# Patient Record
Sex: FEMALE | Race: WHITE | NOT HISPANIC OR LATINO
[De-identification: names, ages, dates, MRNs, and addresses within clinical notes are randomized per-mention and may not be internally consistent; named-entity substitution may affect disease eponyms.]

---

## 2019-11-21 PROBLEM — Z00.00 ENCOUNTER FOR PREVENTIVE HEALTH EXAMINATION: Status: ACTIVE | Noted: 2019-11-21

## 2019-12-02 ENCOUNTER — APPOINTMENT (OUTPATIENT)
Dept: COLORECTAL SURGERY | Facility: CLINIC | Age: 34
End: 2019-12-02
Payer: COMMERCIAL

## 2019-12-02 ENCOUNTER — APPOINTMENT (OUTPATIENT)
Dept: GASTROENTEROLOGY | Facility: CLINIC | Age: 34
End: 2019-12-02
Payer: COMMERCIAL

## 2019-12-02 VITALS
HEART RATE: 75 BPM | BODY MASS INDEX: 30.46 KG/M2 | WEIGHT: 201 LBS | SYSTOLIC BLOOD PRESSURE: 106 MMHG | HEIGHT: 68 IN | TEMPERATURE: 97.6 F | DIASTOLIC BLOOD PRESSURE: 71 MMHG

## 2019-12-02 VITALS
SYSTOLIC BLOOD PRESSURE: 123 MMHG | TEMPERATURE: 97.9 F | WEIGHT: 200 LBS | DIASTOLIC BLOOD PRESSURE: 78 MMHG | OXYGEN SATURATION: 100 % | HEART RATE: 76 BPM | BODY MASS INDEX: 30.31 KG/M2 | HEIGHT: 68 IN | RESPIRATION RATE: 14 BRPM

## 2019-12-02 DIAGNOSIS — R53.83 OTHER FATIGUE: ICD-10-CM

## 2019-12-02 DIAGNOSIS — Z87.891 PERSONAL HISTORY OF NICOTINE DEPENDENCE: ICD-10-CM

## 2019-12-02 DIAGNOSIS — K30 FUNCTIONAL DYSPEPSIA: ICD-10-CM

## 2019-12-02 DIAGNOSIS — R58 HEMORRHAGE, NOT ELSEWHERE CLASSIFIED: ICD-10-CM

## 2019-12-02 DIAGNOSIS — Z72.89 OTHER PROBLEMS RELATED TO LIFESTYLE: ICD-10-CM

## 2019-12-02 DIAGNOSIS — K59.00 CONSTIPATION, UNSPECIFIED: ICD-10-CM

## 2019-12-02 DIAGNOSIS — K90.49 MALABSORPTION DUE TO INTOLERANCE, NOT ELSEWHERE CLASSIFIED: ICD-10-CM

## 2019-12-02 DIAGNOSIS — Z86.19 PERSONAL HISTORY OF OTHER INFECTIOUS AND PARASITIC DISEASES: ICD-10-CM

## 2019-12-02 PROCEDURE — 99205 OFFICE O/P NEW HI 60 MIN: CPT | Mod: 25

## 2019-12-02 PROCEDURE — 99204 OFFICE O/P NEW MOD 45 MIN: CPT

## 2019-12-02 PROCEDURE — 36415 COLL VENOUS BLD VENIPUNCTURE: CPT

## 2019-12-02 RX ORDER — CHROMIUM 200 MCG
TABLET ORAL
Refills: 0 | Status: ACTIVE | COMMUNITY

## 2019-12-03 LAB
ALBUMIN SERPL ELPH-MCNC: 4.6 G/DL
ALP BLD-CCNC: 96 U/L
ALT SERPL-CCNC: 24 U/L
ANION GAP SERPL CALC-SCNC: 12 MMOL/L
AST SERPL-CCNC: 14 U/L
BASOPHILS # BLD AUTO: 0.08 K/UL
BASOPHILS NFR BLD AUTO: 0.9 %
BILIRUB SERPL-MCNC: <0.2 MG/DL
BUN SERPL-MCNC: 19 MG/DL
CALCIUM SERPL-MCNC: 9.6 MG/DL
CHLORIDE SERPL-SCNC: 106 MMOL/L
CO2 SERPL-SCNC: 23 MMOL/L
CREAT SERPL-MCNC: 0.9 MG/DL
CRP SERPL-MCNC: 1.37 MG/DL
EOSINOPHIL # BLD AUTO: 0.84 K/UL
EOSINOPHIL NFR BLD AUTO: 9.1 %
GLUCOSE SERPL-MCNC: 96 MG/DL
HBV CORE IGG+IGM SER QL: NONREACTIVE
HBV SURFACE AB SER QL: REACTIVE
HBV SURFACE AG SER QL: NONREACTIVE
HCT VFR BLD CALC: 44.9 %
HGB BLD-MCNC: 13.7 G/DL
IMM GRANULOCYTES NFR BLD AUTO: 0.2 %
INR PPP: 0.97 RATIO
LYMPHOCYTES # BLD AUTO: 2.1 K/UL
LYMPHOCYTES NFR BLD AUTO: 22.6 %
MAN DIFF?: NORMAL
MCHC RBC-ENTMCNC: 27.1 PG
MCHC RBC-ENTMCNC: 30.5 GM/DL
MCV RBC AUTO: 88.7 FL
MONOCYTES # BLD AUTO: 0.67 K/UL
MONOCYTES NFR BLD AUTO: 7.2 %
NEUTROPHILS # BLD AUTO: 5.57 K/UL
NEUTROPHILS NFR BLD AUTO: 60 %
PLATELET # BLD AUTO: 429 K/UL
POTASSIUM SERPL-SCNC: 4.9 MMOL/L
PROT SERPL-MCNC: 7.1 G/DL
PT BLD: 10.9 SEC
RBC # BLD: 5.06 M/UL
RBC # FLD: 15.4 %
SODIUM SERPL-SCNC: 141 MMOL/L
WBC # FLD AUTO: 9.28 K/UL

## 2019-12-04 NOTE — PHYSICAL EXAM
[Abdomen Tenderness] : ~T ~M Abdominal tenderness [No HSM] : no hepatosplenomegaly [Normal Breath Sounds] : Normal breath sounds [No Rash or Lesion] : No rash or lesion [Alert] : alert [Abdomen Masses] : No abdominal masses [JVD] : no jugular venous distention

## 2019-12-04 NOTE — HISTORY OF PRESENT ILLNESS
[FreeTextEntry1] : 32 yo F w/ recent diagnosis of Crohn's disease presents for evaluation, referred by ANA Torrez whom patient saw this morning\par hx of Abd pain since April 2019, initially seen by CRS Dr. Zamarripa, colonoscopy completed June 2019 and suggested Crohn's Disease.\par 6/25/19: Focal mild active ileitis, no epithelial dysplasis or adenomatous hyperplasia identified\par Focal neutrophilia w/n surface epithelium and a slightly increased number of chronic inflammatory cells and occasional neutrophils.\par Findings w/ focal mild active ileitis and are suggestive, but not diagnostic of Crohn's ileitis.\par Started her on Budesonide 3 pills daily which improves symptoms.\par \par Seen by PCP Dr. Sifuentes who started on antibiotics for possible UTI and referred to Dr. Miranda\par Referred by ANA Miranda to ANA Torrez and this office for additional evaluation\par Started on Cipro/Flagyl x 4 weeks for treatment of bladder abscess\par \par May 2019 CT a/p identified bladder abscess, pt reports recent CT scans indicate abscess worsening\par Reports bladder symptoms began 5 weeks ago and have been worsening. Wakes 10-15 times to urinate in the night\par \par CT scan 5/2019: \par Inflammatory changes of a long segment of distal ileum highly suspicious for infectious or inflammatory disease with small contained perforation in the midline upper pelvis. No evidence of abscess. \par \par CT scan 11/15/2019: \par Compared to the prior CT scans is again noted lung segment transmural circumferential disease of the TI and previously described abscess located just anterior to this diseased TI is again noted. However, it is now intimately related and partially incorporated into the anterosuperior wall of the urinary bladder. There is presently no gas in the urinary bladder. Abscess measures 3 cm x 1.5 cm. The urinary bladder is not distended. Urology consultation is recommended.\par \par Pt presents today c/o some abdominal pain and nausea after palpated during exam in GI office today.\par Denies fever, or vomiting, denies blood in urine. \par Denies passing stool in urine or passing flatus through urethra or vagina.\par Reports scant BRBPR noted on TP w/ some BMs\par \par BH: every other day to daily, feels "pressure" or urgency and sometimes unable to pass stool. Typically diarrhea, some soft/formed and occasionally hard stool which causes sharp "needle" pain\par On low fiber, high protein diet at present\par Denies stool softeners or fiber supplements\par \par Denies colorectal CA or IBD, colitis in parents. Unknown family history for distant relatives.

## 2019-12-04 NOTE — ASSESSMENT
[FreeTextEntry1] : I had extensive discussion with the patient-45 minutes-regarding the options for treatment at this time. We will obtain a CT scan discs for review and present her case at multidisciplinary inflammatory bowel disease conference in one week. In the interim she will continue with a course of broad-spectrum oral antibiotics. If her symptoms improve we will continue a course of nonsurgical therapy. However if she develops pneumaturia or signs of enterovesical fistula or increased or persistent abdominal pain and discomfort we will plan for surgical resection. I have outlined to her a course of surgery that would include a robotic ileocolic resection. The risks, benefits and alternatives were detailed.

## 2019-12-04 NOTE — CONSULT LETTER
[Dear  ___] : Dear  [unfilled], [Consult Letter:] : I had the pleasure of evaluating your patient, [unfilled]. [( Thank you for referring [unfilled] for consultation for _____ )] : Thank you for referring [unfilled] for consultation for [unfilled] [Please see my note below.] : Please see my note below. [Sincerely,] : Sincerely, [Consult Closing:] : Thank you very much for allowing me to participate in the care of this patient.  If you have any questions, please do not hesitate to contact me. [FreeTextEntry2] : KASSIDY WALKER\par [address]\[place] [address] [FreeTextEntry3] : LIZ BORJAS MD

## 2019-12-05 LAB
M TB IFN-G BLD-IMP: NEGATIVE
QUANTIFERON TB PLUS MITOGEN MINUS NIL: 4.48 IU/ML
QUANTIFERON TB PLUS NIL: 0.03 IU/ML
QUANTIFERON TB PLUS TB1 MINUS NIL: 0 IU/ML
QUANTIFERON TB PLUS TB2 MINUS NIL: 0.01 IU/ML

## 2019-12-09 ENCOUNTER — APPOINTMENT (OUTPATIENT)
Dept: GASTROENTEROLOGY | Facility: HOSPITAL | Age: 34
End: 2019-12-09

## 2019-12-09 NOTE — ASSESSMENT
[FreeTextEntry1] : 32 yo F PMH recent diagnosis of Crohns disease (likely aggressive phenotype, diagnosed June 2019, currently on budesonide 9mg/day since June 2019), now with enlarging abscess compressing the bladder who presents for multimodality management,  referred by Dr. Miranda.\par \par Crohn's disease with ileal inflammation on imaging and intraabdominal abscess. We do not have colonoscopy reports available for review, but patient was reportedly told she had inflammation and evidence of Crohn's disease by Dr. Zamarripa at the time of the procedure. Given ongoing infection (the abscess) it is prudent to avoid immunosuppressives/biologics to treat CD at this time. Plan to start with course of antibiotics to see if abscess responds. (She has been on intermittent antibiotics for presumed UTI, but nothing longterm for the abscess). \par \par In the interim we plan to review imaging to see if able to drain via IR, though suspect it is likely too small and in a difficult to access location. We will also discuss the case in our multidisciplinary conference to see if she is an appropriate surgical candidate for resection and what type of resection would be most appropriate (limited ileocolonic resection vs R hemicolectomy) depending on extent of inflammation in absence of response to abx. Furthermore it does not seem at this time there is not, clinically, a colo-vesicular fistula (though we don't have Ucx results).\par \par \par - unable to wean off of budesonide 2/2 severe worsening of pain, continue budesonide for now, as well as calcium, vitamin D while on budesonide\par - start antibiotics (flagyl 500 TID and cipro 500 BID) for intraabdominal abscess\par - patient will see Dr. Jimenes of colorectal surgery to discuss surgical strategy \par - follow up with previously scheduled appt with urology given bladder compression, frequent urination, and concern for recurrent UTIs\par - pre-biologic labs today (in preparation for biologic in the future once abscess resolved): quantiferon, hep b\par - will also draw CBC, CMP, CRP\par - obtain records from prior imaging studies, colonoscopy, EGD, path reports for review\par - will discuss case at multidisciplinary conference to determine next steps\par \par HCM: \par next visit address dexa scan given ongoing budesonide\par ensure flu vaccine\par \par Patient seen and discussed with Dr. Torrez\par \par Elizabeth Vance M.D. PGY-7\par IBD Fellow\par

## 2019-12-09 NOTE — PHYSICAL EXAM
[General Appearance - Alert] : alert [Sclera] : the sclera and conjunctiva were normal [Outer Ear] : the ears and nose were normal in appearance [Neck Appearance] : the appearance of the neck was normal [] : no respiratory distress [Heart Rate And Rhythm] : heart rate was normal and rhythm regular [Abdomen Soft] : soft [Bowel Sounds] : normal bowel sounds [FreeTextEntry1] : Significant suprapubic tenderness and RLQ; but no masses [Cervical Lymph Nodes Enlarged Posterior Bilaterally] : posterior cervical [Cervical Lymph Nodes Enlarged Anterior Bilaterally] : anterior cervical [Abnormal Walk] : normal gait [No CVA Tenderness] : no ~M costovertebral angle tenderness [Skin Color & Pigmentation] : normal skin color and pigmentation [Skin Turgor] : normal skin turgor [Oriented To Time, Place, And Person] : oriented to person, place, and time [Impaired Insight] : insight and judgment were intact

## 2019-12-09 NOTE — HISTORY OF PRESENT ILLNESS
[_________] : Performed [unfilled] [GERD] : no gastroesophageal reflux disease [Hiatus Hernia] : no hiatus hernia [Peptic Ulcer Disease] : no peptic ulcer disease [Pancreatitis] : no pancreatitis [Cholelithiasis] : no cholelithiasis [Kidney Stone] : no kidney stone [Irritable Bowel Syndrome] : no irritable bowel syndrome [Diverticulitis] : no diverticulitis [Alcohol Abuse] : no alcohol abuse [Abdominal Surgery] : no abdominal surgery [Malignancy] : no malignancy [Appendectomy] : no appendectomy [Cholecystectomy] : no cholecystectomy [de-identified] : 32 yo F PMH recent diagnosis Crohn's disease (likely aggressive phenotype) who presents after referred for second opinion by Dr. Miranda.\par \par April 2019: Abdominal pain initially started, was moving all around. She initially thought it was her menstrual cycle so waited to go in. She has always been conscious of fiber, her diet. But no major GI issues in the past. \par \par May 2019: Went to urgent care. Was seen by 2 doctors. Initially did an xray, then ultrasound, then CT scan. Then she was sent to the hospital. She was hospitalized at [place] for the abdomial pain. She was asked to stay on bedrest at home for a week. SHe was getting stressed because she needed to get back to work. \par \par May 2019: saw a GI doctor, got a few more CT scans - no chagne but things not getting worse. Dr. Zamarripa (GI surgeon in [facility]). \par \par June 2019: She was started on budesonide. She has been on this since June 2019. If she went off of steroids for 2 days, she would be in extreme pain. EGD and Colonoscopy were done by Dr. Zamarripa. He told her this is "absolutely Crohn's". \par \par Went back to see PMD Chela Sifuentes ([facility]). She referred her to Dr. Miranda. Since then she has been having a lot of bladder problems. The thought is that intestines are pushing on bladder. She can't sleep because always urinating. She is getting frequent UTIs. She has an abscess on her bladder. \par \par She still notices that during her periods, things are much worse and can't work. Budesonide makes symptoms livable. She feels exhausted and tired all the time. She wakes up 10x a night to urinate. \par \par Currently she has been having diarrhea. If she has solid stool usually hurts coming out. It seems to fluctuate. If she wipes sees a little blood on the toilet paper. Goes to the bathroom once a day. Has been loose in cosistency a lot. \par \par Started smoking a lot of marijuana because this makes her feel like eating and gives her some relief. She started having a "fear" of eating because worried about having abdominal pain or food not setting well. Usually goes once in the morning. \par \par In the hospital was having fevers. No nausea/vomiting. No air in the urine or solid material in the urine.\par \par No EIMs. \par \par Patient reports she has had several CT scans (approx 4). and the most recent CT reportedly showed an enlarging abscess. She has been on and off of antibiotics for UTI. Most recently completed a course for UTI about a week ago (does not recall the names of the medications). She notes that Dr. Miranda also thought she may need antibiotics for the abscess. \par \par CT scan 2015: \par Inflammatory changes of a long segment of distal ileum highly suspicious for infectious or inflammatory disease with small contained perforation in the midline upper pelvis. No evidence of abscess. \par \par CT scan 2019: \par Compared to the prior CT scans is again noted lung segment transmural circumferential disease of the TI and previously described abscess located just anterior to this diseased TI is again noted. However, it is now intimately related and partially incorporated into the anterosuperior wall of the urinary bladder. There is presently no air - gas in the urinary bladder. Urinary bladder is not distended. Urology consultation is recommended. \par \par The appendix appears unremarkable. There is no obvious olitis pattern. No evidence of diverticular disease. Enhancing finding the right ovary which is probably follicular cyst. No extra-axial ovarian adnexal masses nor free fluid int he cul-de-sac. \par \par MEDS: \par Budesonide 3 pills a day\par Antibiotics for UTI\par Vitamin D twice a week\par \par ALL: NKDA\par \par SH: \par Works as  at a [employer] in [place]\par Use marijuana twice a day \par Prior smoker: smoked socially in college to now, but has completely stopped \par EtOH: glass of wine with dinner, if attending an event once a week, in general has been less\par \par FH: \par Does not know family history other than mom and dad, and parents do not have IBD, sister does not have IBD, no colorectal cancer\par \par PMH: denies\par \par PSH: denies\par \par  [de-identified] : Results will be obtained from GI doctor

## 2019-12-09 NOTE — CONSULT LETTER
[Dear  ___] : Dear  [unfilled], [Consult Letter:] : I had the pleasure of evaluating your patient, [unfilled]. [Please see my note below.] : Please see my note below. [Consult Closing:] : Thank you very much for allowing me to participate in the care of this patient.  If you have any questions, please do not hesitate to contact me. [Sincerely,] : Sincerely, [FreeTextEntry3] : Ruperto Torrez MD\par Associate Professor of Medicine\par Director IBD Program\par [facility]\par  [DrAnnmarie  ___] : Dr. CODY

## 2019-12-18 ENCOUNTER — RX RENEWAL (OUTPATIENT)
Age: 34
End: 2019-12-18

## 2019-12-27 ENCOUNTER — TRANSCRIPTION ENCOUNTER (OUTPATIENT)
Age: 34
End: 2019-12-27

## 2020-03-02 ENCOUNTER — LABORATORY RESULT (OUTPATIENT)
Age: 35
End: 2020-03-02

## 2020-03-02 ENCOUNTER — APPOINTMENT (OUTPATIENT)
Dept: COLORECTAL SURGERY | Facility: CLINIC | Age: 35
End: 2020-03-02
Payer: COMMERCIAL

## 2020-03-02 VITALS
BODY MASS INDEX: 30.31 KG/M2 | SYSTOLIC BLOOD PRESSURE: 120 MMHG | WEIGHT: 200 LBS | HEART RATE: 103 BPM | TEMPERATURE: 99 F | DIASTOLIC BLOOD PRESSURE: 80 MMHG | HEIGHT: 68 IN

## 2020-03-02 DIAGNOSIS — R10.9 UNSPECIFIED ABDOMINAL PAIN: ICD-10-CM

## 2020-03-02 DIAGNOSIS — R35.0 FREQUENCY OF MICTURITION: ICD-10-CM

## 2020-03-02 PROCEDURE — 99215 OFFICE O/P EST HI 40 MIN: CPT

## 2020-03-02 RX ORDER — CIPROFLOXACIN HYDROCHLORIDE 500 MG/1
500 TABLET, FILM COATED ORAL TWICE DAILY
Qty: 56 | Refills: 1 | Status: DISCONTINUED | COMMUNITY
Start: 2019-12-02 | End: 2020-03-02

## 2020-03-02 RX ORDER — METRONIDAZOLE 500 MG/1
500 TABLET ORAL
Qty: 63 | Refills: 1 | Status: DISCONTINUED | COMMUNITY
Start: 2019-12-02 | End: 2020-03-02

## 2020-03-02 NOTE — PHYSICAL EXAM
[Abdomen Masses] : No abdominal masses [JVD] : no jugular venous distention  [Abdomen Tenderness] : ~T ~M Abdominal tenderness [No HSM] : no hepatosplenomegaly [Alert] : alert [No Rash or Lesion] : No rash or lesion [Normal Breath Sounds] : Normal breath sounds [de-identified] : lower abdominal tenderness

## 2020-03-02 NOTE — ASSESSMENT
[FreeTextEntry1] : I reviewed with the patient that we will proceed with a repeat CT scan for further assessment of her Crohn's disease. We will evaluate for possible enterovesicle fistula.\par \par check urine today.\par \par I had extensive discussion with the patient (45 minutes) regarding the diagnosis and treatment options. I recommended that he consider proceeding with a robotic ileocolic resection and possible repair of enterovesical fistula.\par \par The associated risks, benefits, alternatives of the procedure have been outlined discussed and reviewed with the patient's family. These risks including but not limited to bleeding, infection, anastomotic leak, need for secondary surgery, need for ileostomy or colostomy creation, change in bowel habits, change in urinary function, n, as well as the risk of heart and lung complications infection and death were detailed. The patient understands these risks and consents the planned procedure. Appropriate  literature regarding surgery and post operative treatment/complications and enhanced recovery pathway has been detailed and reviewed. Consent was obtained. All questions were answered.\par

## 2020-03-02 NOTE — HISTORY OF PRESENT ILLNESS
[FreeTextEntry1] : 33 y/o F presents for f/u Crohn's and possible bladder abscess\par Diagnosed with Crohn's 6/19 based on colonoscopy findings after experiencing abdominal pain for several months\par \par May 2019 CT a/p identified bladder abscess, pt reports recent CT scans indicate abscess worsening\par Reports bladder symptoms began 5 weeks ago and have been worsening. Wakes 10-15 times to urinate in the night\par \par CT scan 5/2019: \par Inflammatory changes of a long segment of distal ileum highly suspicious for infectious or inflammatory disease with small contained perforation in the midline upper pelvis. No evidence of abscess. \par \par CT scan 11/15/2019: \par Compared to the prior CT scans is again noted lung segment transmural circumferential disease of the TI and previously described abscess located just anterior to this diseased TI is again noted. However, it is now intimately related and partially incorporated into the anterosuperior wall of the urinary bladder. There is presently no gas in the urinary bladder. Abscess measures 3 cm x 1.5 cm. The urinary bladder is not distended. Urology consultation is recommended.\par \par \par Continues to use Budesonide daily until the last 2 days due to fact that she forgot it when she travelled to the city for appointment. Reports lower abdominal tenderness since missing medication. Was able to find 3 pills that she took this morning. Also reports pain is worse with menses\par Reports increased urination recently, waking her up at time. Also reports soreness when cleaning, uncertain if its related to a UTI or excessive cleaning. These symptoms have been occurring over the last several weeks but worsened slightly \par Denies fever, chills, odor, flank pain or hematuria\par Reports fatigue recently, believes it may be related to Crohn's\par Appetite intact, feels good overall \par BH: several times daily to every other day\par Denies bleeding  or mucous with BM's\par Patient recently underwent emergency dental procedure, completed a one week course of Amoxicillin TID

## 2020-03-04 LAB
APPEARANCE: ABNORMAL
BACTERIA UR CULT: NORMAL
BILIRUBIN URINE: NEGATIVE
BLOOD URINE: ABNORMAL
COLOR: YELLOW
GLUCOSE QUALITATIVE U: NEGATIVE
KETONES URINE: NEGATIVE
LEUKOCYTE ESTERASE URINE: ABNORMAL
NITRITE URINE: NEGATIVE
PH URINE: 6
PROTEIN URINE: ABNORMAL
SPECIFIC GRAVITY URINE: 1.03
UROBILINOGEN URINE: NORMAL

## 2020-03-16 DIAGNOSIS — R30.9 PAINFUL MICTURITION, UNSPECIFIED: ICD-10-CM

## 2020-03-24 ENCOUNTER — APPOINTMENT (OUTPATIENT)
Dept: COLORECTAL SURGERY | Facility: HOSPITAL | Age: 35
End: 2020-03-24

## 2020-04-08 RX ORDER — IBUPROFEN 200 MG
500-125 CAPSULE ORAL DAILY
Qty: 30 | Refills: 5 | Status: ACTIVE | COMMUNITY
Start: 2020-04-08 | End: 1900-01-01

## 2020-05-07 ENCOUNTER — APPOINTMENT (OUTPATIENT)
Dept: COLORECTAL SURGERY | Facility: HOSPITAL | Age: 35
End: 2020-05-07

## 2020-06-11 DIAGNOSIS — M54.5 LOW BACK PAIN: ICD-10-CM

## 2020-07-12 ENCOUNTER — LABORATORY RESULT (OUTPATIENT)
Age: 35
End: 2020-07-12

## 2020-07-13 ENCOUNTER — TRANSCRIPTION ENCOUNTER (OUTPATIENT)
Age: 35
End: 2020-07-13

## 2020-07-14 ENCOUNTER — APPOINTMENT (OUTPATIENT)
Dept: COLORECTAL SURGERY | Facility: HOSPITAL | Age: 35
End: 2020-07-14

## 2020-07-14 ENCOUNTER — INPATIENT (INPATIENT)
Facility: HOSPITAL | Age: 35
LOS: 4 days | Discharge: HOME | DRG: 331 | End: 2020-07-19
Attending: SURGERY | Admitting: SURGERY
Payer: COMMERCIAL

## 2020-07-14 ENCOUNTER — RESULT REVIEW (OUTPATIENT)
Age: 35
End: 2020-07-14

## 2020-07-14 VITALS
DIASTOLIC BLOOD PRESSURE: 83 MMHG | TEMPERATURE: 97 F | OXYGEN SATURATION: 98 % | RESPIRATION RATE: 16 BRPM | WEIGHT: 202.38 LBS | SYSTOLIC BLOOD PRESSURE: 120 MMHG | HEIGHT: 68 IN | HEART RATE: 91 BPM

## 2020-07-14 LAB
ANION GAP SERPL CALC-SCNC: 13 MMOL/L — SIGNIFICANT CHANGE UP (ref 5–17)
BUN SERPL-MCNC: 11 MG/DL — SIGNIFICANT CHANGE UP (ref 7–23)
CALCIUM SERPL-MCNC: 8.1 MG/DL — LOW (ref 8.4–10.5)
CHLORIDE SERPL-SCNC: 108 MMOL/L — SIGNIFICANT CHANGE UP (ref 96–108)
CO2 SERPL-SCNC: 19 MMOL/L — LOW (ref 22–31)
CREAT SERPL-MCNC: 1.09 MG/DL — SIGNIFICANT CHANGE UP (ref 0.5–1.3)
GLUCOSE BLDC GLUCOMTR-MCNC: 92 MG/DL — SIGNIFICANT CHANGE UP (ref 70–99)
GLUCOSE SERPL-MCNC: 171 MG/DL — HIGH (ref 70–99)
HCT VFR BLD CALC: 37.9 % — SIGNIFICANT CHANGE UP (ref 34.5–45)
HGB BLD-MCNC: 12.1 G/DL — SIGNIFICANT CHANGE UP (ref 11.5–15.5)
MAGNESIUM SERPL-MCNC: 2.8 MG/DL — HIGH (ref 1.6–2.6)
MCHC RBC-ENTMCNC: 27.1 PG — SIGNIFICANT CHANGE UP (ref 27–34)
MCHC RBC-ENTMCNC: 31.9 GM/DL — LOW (ref 32–36)
MCV RBC AUTO: 84.8 FL — SIGNIFICANT CHANGE UP (ref 80–100)
NRBC # BLD: 0 /100 WBCS — SIGNIFICANT CHANGE UP (ref 0–0)
PHOSPHATE SERPL-MCNC: 3.1 MG/DL — SIGNIFICANT CHANGE UP (ref 2.5–4.5)
PLATELET # BLD AUTO: 318 K/UL — SIGNIFICANT CHANGE UP (ref 150–400)
POTASSIUM SERPL-MCNC: 4.4 MMOL/L — SIGNIFICANT CHANGE UP (ref 3.5–5.3)
POTASSIUM SERPL-SCNC: 4.4 MMOL/L — SIGNIFICANT CHANGE UP (ref 3.5–5.3)
RBC # BLD: 4.47 M/UL — SIGNIFICANT CHANGE UP (ref 3.8–5.2)
RBC # FLD: 13.2 % — SIGNIFICANT CHANGE UP (ref 10.3–14.5)
SODIUM SERPL-SCNC: 140 MMOL/L — SIGNIFICANT CHANGE UP (ref 135–145)
WBC # BLD: 16.95 K/UL — HIGH (ref 3.8–10.5)
WBC # FLD AUTO: 16.95 K/UL — HIGH (ref 3.8–10.5)

## 2020-07-14 PROCEDURE — 44205 LAP COLECTOMY PART W/ILEUM: CPT | Mod: GC

## 2020-07-14 PROCEDURE — 88307 TISSUE EXAM BY PATHOLOGIST: CPT | Mod: 26

## 2020-07-14 RX ORDER — ENOXAPARIN SODIUM 100 MG/ML
40 INJECTION SUBCUTANEOUS ONCE
Refills: 0 | Status: COMPLETED | OUTPATIENT
Start: 2020-07-14 | End: 2020-07-14

## 2020-07-14 RX ORDER — ACETAMINOPHEN 500 MG
1000 TABLET ORAL EVERY 6 HOURS
Refills: 0 | Status: DISCONTINUED | OUTPATIENT
Start: 2020-07-14 | End: 2020-07-19

## 2020-07-14 RX ORDER — HEPARIN SODIUM 5000 [USP'U]/ML
5000 INJECTION INTRAVENOUS; SUBCUTANEOUS ONCE
Refills: 0 | Status: COMPLETED | OUTPATIENT
Start: 2020-07-14 | End: 2020-07-14

## 2020-07-14 RX ORDER — HYDROMORPHONE HYDROCHLORIDE 2 MG/ML
0.5 INJECTION INTRAMUSCULAR; INTRAVENOUS; SUBCUTANEOUS EVERY 4 HOURS
Refills: 0 | Status: DISCONTINUED | OUTPATIENT
Start: 2020-07-14 | End: 2020-07-19

## 2020-07-14 RX ORDER — KETOROLAC TROMETHAMINE 30 MG/ML
15 SYRINGE (ML) INJECTION EVERY 6 HOURS
Refills: 0 | Status: DISCONTINUED | OUTPATIENT
Start: 2020-07-14 | End: 2020-07-17

## 2020-07-14 RX ORDER — ACETAMINOPHEN 500 MG
1000 TABLET ORAL ONCE
Refills: 0 | Status: COMPLETED | OUTPATIENT
Start: 2020-07-14 | End: 2020-07-14

## 2020-07-14 RX ORDER — LANOLIN ALCOHOL/MO/W.PET/CERES
5 CREAM (GRAM) TOPICAL AT BEDTIME
Refills: 0 | Status: DISCONTINUED | OUTPATIENT
Start: 2020-07-14 | End: 2020-07-19

## 2020-07-14 RX ORDER — MAGNESIUM SULFATE 500 MG/ML
2 VIAL (ML) INJECTION
Refills: 0 | Status: COMPLETED | OUTPATIENT
Start: 2020-07-14 | End: 2020-07-14

## 2020-07-14 RX ORDER — ONDANSETRON 8 MG/1
4 TABLET, FILM COATED ORAL EVERY 6 HOURS
Refills: 0 | Status: DISCONTINUED | OUTPATIENT
Start: 2020-07-14 | End: 2020-07-19

## 2020-07-14 RX ORDER — SODIUM CHLORIDE 9 MG/ML
1000 INJECTION, SOLUTION INTRAVENOUS
Refills: 0 | Status: DISCONTINUED | OUTPATIENT
Start: 2020-07-14 | End: 2020-07-18

## 2020-07-14 RX ORDER — GABAPENTIN 400 MG/1
600 CAPSULE ORAL ONCE
Refills: 0 | Status: COMPLETED | OUTPATIENT
Start: 2020-07-14 | End: 2020-07-14

## 2020-07-14 RX ORDER — BUPIVACAINE 13.3 MG/ML
20 INJECTION, SUSPENSION, LIPOSOMAL INFILTRATION ONCE
Refills: 0 | Status: DISCONTINUED | OUTPATIENT
Start: 2020-07-14 | End: 2020-07-19

## 2020-07-14 RX ORDER — CEFTRIAXONE 500 MG/1
1000 INJECTION, POWDER, FOR SOLUTION INTRAMUSCULAR; INTRAVENOUS EVERY 24 HOURS
Refills: 0 | Status: DISCONTINUED | OUTPATIENT
Start: 2020-07-14 | End: 2020-07-18

## 2020-07-14 RX ORDER — ENOXAPARIN SODIUM 100 MG/ML
40 INJECTION SUBCUTANEOUS EVERY 24 HOURS
Refills: 0 | Status: DISCONTINUED | OUTPATIENT
Start: 2020-07-14 | End: 2020-07-14

## 2020-07-14 RX ADMIN — Medication 1000 MILLIGRAM(S): at 23:01

## 2020-07-14 RX ADMIN — Medication 1000 MILLIGRAM(S): at 07:17

## 2020-07-14 RX ADMIN — HYDROMORPHONE HYDROCHLORIDE 0.5 MILLIGRAM(S): 2 INJECTION INTRAMUSCULAR; INTRAVENOUS; SUBCUTANEOUS at 22:04

## 2020-07-14 RX ADMIN — SODIUM CHLORIDE 40 MILLILITER(S): 9 INJECTION, SOLUTION INTRAVENOUS at 17:45

## 2020-07-14 RX ADMIN — GABAPENTIN 600 MILLIGRAM(S): 400 CAPSULE ORAL at 07:17

## 2020-07-14 RX ADMIN — Medication 15 MILLIGRAM(S): at 19:24

## 2020-07-14 RX ADMIN — Medication 1000 MILLIGRAM(S): at 18:45

## 2020-07-14 RX ADMIN — HYDROMORPHONE HYDROCHLORIDE 0.5 MILLIGRAM(S): 2 INJECTION INTRAMUSCULAR; INTRAVENOUS; SUBCUTANEOUS at 22:54

## 2020-07-14 RX ADMIN — Medication 5 MILLIGRAM(S): at 23:01

## 2020-07-14 RX ADMIN — HEPARIN SODIUM 5000 UNIT(S): 5000 INJECTION INTRAVENOUS; SUBCUTANEOUS at 07:18

## 2020-07-14 RX ADMIN — HYDROMORPHONE HYDROCHLORIDE 0.5 MILLIGRAM(S): 2 INJECTION INTRAMUSCULAR; INTRAVENOUS; SUBCUTANEOUS at 19:24

## 2020-07-14 RX ADMIN — Medication 100 GRAM(S): at 10:11

## 2020-07-14 RX ADMIN — Medication 15 MILLIGRAM(S): at 18:45

## 2020-07-14 RX ADMIN — ENOXAPARIN SODIUM 40 MILLIGRAM(S): 100 INJECTION SUBCUTANEOUS at 18:45

## 2020-07-14 RX ADMIN — Medication 1000 MILLIGRAM(S): at 23:53

## 2020-07-14 RX ADMIN — Medication 50 GRAM(S): at 16:10

## 2020-07-14 RX ADMIN — HYDROMORPHONE HYDROCHLORIDE 0.5 MILLIGRAM(S): 2 INJECTION INTRAMUSCULAR; INTRAVENOUS; SUBCUTANEOUS at 17:45

## 2020-07-14 RX ADMIN — CEFTRIAXONE 100 MILLIGRAM(S): 500 INJECTION, POWDER, FOR SOLUTION INTRAMUSCULAR; INTRAVENOUS at 15:50

## 2020-07-14 RX ADMIN — Medication 1000 MILLIGRAM(S): at 19:24

## 2020-07-14 NOTE — BRIEF OPERATIVE NOTE - OPERATION/FINDINGS
laparoscopic robot assisted right hemicolectomy  takedown of fistula and evacuation of abscess cavity  concern for bladder involvement of abscess cavity, evaluated by urology who recommended laguerre decompression for 10 days   mobilization of hepatic flexure, ileocolic and right branch of the middle colic artery pedicles identified and taken with a vessel seal, transverse colon taken with da Ashly stapler, terminal ileum proximal to the disease segment taken with da Ashly stapler. end to end anastomosis performed with da Ashly stapler and anterior layer of the anastomosis performed with running PDS suture, specimen removed via lower incision, fascia closed with PDS, skin closed with monocryl laparoscopic robot assisted right hemicolectomy  takedown of fistula and evacuation of abscess cavity  concern for bladder involvement of abscess cavity, evaluated by urology who recommended laguerre decompression for 10 days   mobilization of hepatic flexure, ileocolic and right branch of the middle colic artery pedicles identified and taken with a vessel seal, transverse colon taken with da Ashly stapler, terminal ileum proximal to the disease segment taken with da Ashly stapler. end to end anastomosis performed with da Ashly stapler and anterior layer of the anastomosis performed with running quill suture, specimen removed via lower incision, fascia closed with PDS, skin closed with monocryl

## 2020-07-14 NOTE — H&P ADULT - NSHPPHYSICALEXAM_GEN_ALL_CORE
Brief SW note:     SW consulted for discharge planning. Spoke with PA, premature consult as medical workup still being completed and needs unknown at this time. Please re-consult when SW needs are identified.     Tiana Brice, MURRAY   Inpatient Care Coordination  [facility]   [phone]     Constitutional: WDWN NAD  Heart: S1 S2  Lungs: breathing with minimal effort, no use of accessory muscles  Abdomen: soft, nontender, nondistended, without ecchymosis, erythema, masses

## 2020-07-14 NOTE — H&P ADULT - HISTORY OF PRESENT ILLNESS
34 year old female with PMH of Crohn's disease and possible bladder abscess presents today for elective ileo colic resection. Patient was initially diagnosed with Crohn's disease on 6/19 by colonoscopy after several month history of abdominal pain. Her last CT scan on 11/15/19 showed long segment transmural circumferential disease of the terminal ileum and previous described abscess located just anterior to the diseased terminal ileum, intimately related and partially incorporated into the anterosuperior wall of the urinary bladder. Patient currently has no complaints.

## 2020-07-14 NOTE — PROGRESS NOTE ADULT - SUBJECTIVE AND OBJECTIVE BOX
POST-OPERATIVE NOTE    Procedure: Elective robotic-assisted laparoscopic right hemicolectomy and ileocecectomy with fistula takedown and abdominal abscess evacuation    Diagnosis/Indication: Crohn's disease with perivesicular abscess    Surgeon: Dr. Jimenes    S: Pt has no complaints. Has not ambulated since her procedure. Reporting some bilateral upper extremity and back soreness along with some abdominal tenderness. Denies n/v, CP, SOB, flatus or BMs.     O:  T(C): 36.8 (07-14-20 @ 13:57), Max: 36.8 (07-14-20 @ 13:57)  T(F): 98.2 (07-14-20 @ 13:57), Max: 98.2 (07-14-20 @ 13:57)  HR: 79 (07-14-20 @ 15:27) (79 - 103)  BP: 105/60 (07-14-20 @ 15:27) (99/57 - 135/62)  RR: 14 (07-14-20 @ 15:27) (13 - 23)  SpO2: 95% (07-14-20 @ 15:27) (95% - 100%)  Wt(kg): --                        12.1   16.95 )-----------( 318      ( 14 Jul 2020 14:58 )             37.9     07-14    140  |  108  |  11  ----------------------------<  171<H>  4.4   |  19<L>  |  1.09    Ca    8.1<L>      14 Jul 2020 14:58  Phos  3.1     07-14  Mg     2.8     07-14        Gen: NAD, resting comfortably in bed  C/V: NSR  Pulm: Nonlabored breathing, no respiratory distress. On 3L NC.  Abd: soft, mildly distended. Appropriately diffuse tenderness to palpation. No rebound or guarding.   Extrem: WWP, no calf edema or tenderness, SCDs in place  Incision: c/d/i      A/P: 34y Female s/p above procedure    Diet: CLD  IVF: LR 40cc/hr  Per urology-laguerre to stay for ten days postoperatively  Analgesia with Tylenol, Toradol, Gabapentin, and Dilaudid  Antiemetics PRN  SQH/SCDs/OOBA/IS

## 2020-07-14 NOTE — H&P ADULT - ASSESSMENT
34 year old female with PMH of Crohn's disease and possible bladder abscess presents today for elective ileo colic resection.     Plan:  To OR, admission post op  ERAS protocol  Colon bundle

## 2020-07-15 LAB
ANION GAP SERPL CALC-SCNC: 8 MMOL/L — SIGNIFICANT CHANGE UP (ref 5–17)
BASOPHILS # BLD AUTO: 0.01 K/UL — SIGNIFICANT CHANGE UP (ref 0–0.2)
BASOPHILS NFR BLD AUTO: 0.1 % — SIGNIFICANT CHANGE UP (ref 0–2)
BUN SERPL-MCNC: 10 MG/DL — SIGNIFICANT CHANGE UP (ref 7–23)
CALCIUM SERPL-MCNC: 8.1 MG/DL — LOW (ref 8.4–10.5)
CHLORIDE SERPL-SCNC: 107 MMOL/L — SIGNIFICANT CHANGE UP (ref 96–108)
CO2 SERPL-SCNC: 22 MMOL/L — SIGNIFICANT CHANGE UP (ref 22–31)
CREAT SERPL-MCNC: 0.78 MG/DL — SIGNIFICANT CHANGE UP (ref 0.5–1.3)
EOSINOPHIL # BLD AUTO: 0.01 K/UL — SIGNIFICANT CHANGE UP (ref 0–0.5)
EOSINOPHIL NFR BLD AUTO: 0.1 % — SIGNIFICANT CHANGE UP (ref 0–6)
GLUCOSE SERPL-MCNC: 108 MG/DL — HIGH (ref 70–99)
HCT VFR BLD CALC: 32.8 % — LOW (ref 34.5–45)
HGB BLD-MCNC: 10.3 G/DL — LOW (ref 11.5–15.5)
IMM GRANULOCYTES NFR BLD AUTO: 0.5 % — SIGNIFICANT CHANGE UP (ref 0–1.5)
LYMPHOCYTES # BLD AUTO: 1.3 K/UL — SIGNIFICANT CHANGE UP (ref 1–3.3)
LYMPHOCYTES # BLD AUTO: 10.1 % — LOW (ref 13–44)
MAGNESIUM SERPL-MCNC: 2.2 MG/DL — SIGNIFICANT CHANGE UP (ref 1.6–2.6)
MCHC RBC-ENTMCNC: 27.7 PG — SIGNIFICANT CHANGE UP (ref 27–34)
MCHC RBC-ENTMCNC: 31.4 GM/DL — LOW (ref 32–36)
MCV RBC AUTO: 88.2 FL — SIGNIFICANT CHANGE UP (ref 80–100)
MONOCYTES # BLD AUTO: 1 K/UL — HIGH (ref 0–0.9)
MONOCYTES NFR BLD AUTO: 7.8 % — SIGNIFICANT CHANGE UP (ref 2–14)
NEUTROPHILS # BLD AUTO: 10.49 K/UL — HIGH (ref 1.8–7.4)
NEUTROPHILS NFR BLD AUTO: 81.4 % — HIGH (ref 43–77)
NRBC # BLD: 0 /100 WBCS — SIGNIFICANT CHANGE UP (ref 0–0)
PHOSPHATE SERPL-MCNC: 2.8 MG/DL — SIGNIFICANT CHANGE UP (ref 2.5–4.5)
PLATELET # BLD AUTO: 279 K/UL — SIGNIFICANT CHANGE UP (ref 150–400)
POTASSIUM SERPL-MCNC: 4.5 MMOL/L — SIGNIFICANT CHANGE UP (ref 3.5–5.3)
POTASSIUM SERPL-SCNC: 4.5 MMOL/L — SIGNIFICANT CHANGE UP (ref 3.5–5.3)
RBC # BLD: 3.72 M/UL — LOW (ref 3.8–5.2)
RBC # FLD: 13.4 % — SIGNIFICANT CHANGE UP (ref 10.3–14.5)
SODIUM SERPL-SCNC: 137 MMOL/L — SIGNIFICANT CHANGE UP (ref 135–145)
WBC # BLD: 12.88 K/UL — HIGH (ref 3.8–10.5)
WBC # FLD AUTO: 12.88 K/UL — HIGH (ref 3.8–10.5)

## 2020-07-15 PROCEDURE — 99223 1ST HOSP IP/OBS HIGH 75: CPT

## 2020-07-15 RX ORDER — HYDROMORPHONE HYDROCHLORIDE 2 MG/ML
0.5 INJECTION INTRAMUSCULAR; INTRAVENOUS; SUBCUTANEOUS ONCE
Refills: 0 | Status: DISCONTINUED | OUTPATIENT
Start: 2020-07-15 | End: 2020-07-15

## 2020-07-15 RX ORDER — SODIUM CHLORIDE 9 MG/ML
1000 INJECTION, SOLUTION INTRAVENOUS ONCE
Refills: 0 | Status: COMPLETED | OUTPATIENT
Start: 2020-07-15 | End: 2020-07-15

## 2020-07-15 RX ORDER — HYDROCORTISONE 20 MG
50 TABLET ORAL EVERY 8 HOURS
Refills: 0 | Status: COMPLETED | OUTPATIENT
Start: 2020-07-15 | End: 2020-07-16

## 2020-07-15 RX ADMIN — Medication 1000 MILLIGRAM(S): at 17:43

## 2020-07-15 RX ADMIN — Medication 1000 MILLIGRAM(S): at 05:49

## 2020-07-15 RX ADMIN — HYDROMORPHONE HYDROCHLORIDE 0.5 MILLIGRAM(S): 2 INJECTION INTRAMUSCULAR; INTRAVENOUS; SUBCUTANEOUS at 13:03

## 2020-07-15 RX ADMIN — Medication 15 MILLIGRAM(S): at 06:33

## 2020-07-15 RX ADMIN — SODIUM CHLORIDE 1000 MILLILITER(S): 9 INJECTION, SOLUTION INTRAVENOUS at 16:11

## 2020-07-15 RX ADMIN — Medication 15 MILLIGRAM(S): at 05:49

## 2020-07-15 RX ADMIN — Medication 1000 MILLIGRAM(S): at 18:03

## 2020-07-15 RX ADMIN — Medication 15 MILLIGRAM(S): at 12:20

## 2020-07-15 RX ADMIN — HYDROMORPHONE HYDROCHLORIDE 0.5 MILLIGRAM(S): 2 INJECTION INTRAMUSCULAR; INTRAVENOUS; SUBCUTANEOUS at 20:58

## 2020-07-15 RX ADMIN — CEFTRIAXONE 100 MILLIGRAM(S): 500 INJECTION, POWDER, FOR SOLUTION INTRAMUSCULAR; INTRAVENOUS at 14:16

## 2020-07-15 RX ADMIN — SODIUM CHLORIDE 40 MILLILITER(S): 9 INJECTION, SOLUTION INTRAVENOUS at 21:50

## 2020-07-15 RX ADMIN — Medication 50 MILLIGRAM(S): at 09:32

## 2020-07-15 RX ADMIN — Medication 15 MILLIGRAM(S): at 17:43

## 2020-07-15 RX ADMIN — HYDROMORPHONE HYDROCHLORIDE 0.5 MILLIGRAM(S): 2 INJECTION INTRAMUSCULAR; INTRAVENOUS; SUBCUTANEOUS at 01:59

## 2020-07-15 RX ADMIN — HYDROMORPHONE HYDROCHLORIDE 0.5 MILLIGRAM(S): 2 INJECTION INTRAMUSCULAR; INTRAVENOUS; SUBCUTANEOUS at 07:31

## 2020-07-15 RX ADMIN — Medication 15 MILLIGRAM(S): at 00:24

## 2020-07-15 RX ADMIN — Medication 1000 MILLIGRAM(S): at 11:49

## 2020-07-15 RX ADMIN — Medication 1000 MILLIGRAM(S): at 06:33

## 2020-07-15 RX ADMIN — Medication 1000 MILLIGRAM(S): at 23:13

## 2020-07-15 RX ADMIN — Medication 50 MILLIGRAM(S): at 17:42

## 2020-07-15 RX ADMIN — HYDROMORPHONE HYDROCHLORIDE 0.5 MILLIGRAM(S): 2 INJECTION INTRAMUSCULAR; INTRAVENOUS; SUBCUTANEOUS at 16:55

## 2020-07-15 RX ADMIN — Medication 1000 MILLIGRAM(S): at 12:20

## 2020-07-15 RX ADMIN — HYDROMORPHONE HYDROCHLORIDE 0.5 MILLIGRAM(S): 2 INJECTION INTRAMUSCULAR; INTRAVENOUS; SUBCUTANEOUS at 03:20

## 2020-07-15 RX ADMIN — Medication 15 MILLIGRAM(S): at 11:49

## 2020-07-15 RX ADMIN — Medication 15 MILLIGRAM(S): at 23:14

## 2020-07-15 RX ADMIN — Medication 15 MILLIGRAM(S): at 00:55

## 2020-07-15 RX ADMIN — HYDROMORPHONE HYDROCHLORIDE 0.5 MILLIGRAM(S): 2 INJECTION INTRAMUSCULAR; INTRAVENOUS; SUBCUTANEOUS at 20:45

## 2020-07-15 RX ADMIN — HYDROMORPHONE HYDROCHLORIDE 0.5 MILLIGRAM(S): 2 INJECTION INTRAMUSCULAR; INTRAVENOUS; SUBCUTANEOUS at 10:03

## 2020-07-15 RX ADMIN — Medication 15 MILLIGRAM(S): at 18:03

## 2020-07-15 RX ADMIN — HYDROMORPHONE HYDROCHLORIDE 0.5 MILLIGRAM(S): 2 INJECTION INTRAMUSCULAR; INTRAVENOUS; SUBCUTANEOUS at 09:47

## 2020-07-15 RX ADMIN — Medication 5 MILLIGRAM(S): at 21:49

## 2020-07-15 RX ADMIN — HYDROMORPHONE HYDROCHLORIDE 0.5 MILLIGRAM(S): 2 INJECTION INTRAMUSCULAR; INTRAVENOUS; SUBCUTANEOUS at 16:39

## 2020-07-15 RX ADMIN — HYDROMORPHONE HYDROCHLORIDE 0.5 MILLIGRAM(S): 2 INJECTION INTRAMUSCULAR; INTRAVENOUS; SUBCUTANEOUS at 06:58

## 2020-07-15 RX ADMIN — Medication 15 MILLIGRAM(S): at 23:48

## 2020-07-15 RX ADMIN — HYDROMORPHONE HYDROCHLORIDE 0.5 MILLIGRAM(S): 2 INJECTION INTRAMUSCULAR; INTRAVENOUS; SUBCUTANEOUS at 13:20

## 2020-07-15 NOTE — CONSULT NOTE ADULT - ASSESSMENT
34 year old female with PMH of Crohn's disease and bladder abscess POD 1 of elective ileo colic resection. Patient is doing clinically well and now passing flatus and having BM    -Steroid taper: She is currently on hydrocortisone 50 mg q8h. Recommend taper to hydrocortisone 100 mg tomorrow, followed by hydrocortisone 50 mg. She can then resume her home dose of prednisone 10 mg daily. She will follow up with Dr. Torrez as outpatient to taper steroids and discuss medical therapy for CD  -appreciate excellent surgical care  -pain control  -continue with liquid diet as tolerated  -laguerre in place, to be removed in ten days      Case discussed with Dr. Torrez 34 year old female with PMH of Crohn's disease and bladder abscess POD 1 of elective ileo colic resection. Patient is doing clinically well and now passing flatus and having BM    -Steroid taper: She is currently on hydrocortisone 50 mg q8h. Recommend taper to hydrocortisone 100 mg tomorrow, followed by hydrocortisone 50 mg. She can then resume her home dose of prednisone 10 mg daily. She will follow up with Dr. Torrez as outpatient to taper steroids and discuss medical therapy for CD  -appreciate excellent surgical care  -pain control  -continue with liquid diet as tolerated  -laguerre in place, to be removed in ten days  - Will discuss post op biologic therapy during her OP follow up, but she will need it.

## 2020-07-15 NOTE — PROGRESS NOTE ADULT - ASSESSMENT
34 year old female with PMH of Crohn's disease and possible bladder abscess presents today for elective ileo colic resection now s/p laparoscopic robot assisted right hemicolectomy on 7/14.    Pain/nausea control prn  Diet: CLD  IVF: LR 40cc/hr  Per urology-laguerre to stay for ten days postoperatively  SQH/SCDs/OOBA/IS  AM labs

## 2020-07-15 NOTE — CONSULT NOTE ADULT - SUBJECTIVE AND OBJECTIVE BOX
HPI:  34 year old female with PMH of Crohn's disease and possible bladder abscess POD 1 of elective ileo colic resection.     Since procedure     Allergies    No Known Allergies    Intolerances      Home Medications:    MEDICATIONS:  MEDICATIONS  (STANDING):  acetaminophen   Tablet .. 1000 milliGRAM(s) Oral every 6 hours  BUpivacaine liposome 1.3% Injectable (no eMAR) 20 milliLiter(s) Local Injection once  cefTRIAXone   IVPB 1000 milliGRAM(s) IV Intermittent every 24 hours  hydrocortisone sodium succinate Injectable 50 milliGRAM(s) IV Push every 8 hours  ketorolac   Injectable 15 milliGRAM(s) IV Push every 6 hours  lactated ringers. 1000 milliLiter(s) (40 mL/Hr) IV Continuous <Continuous>  melatonin 5 milliGRAM(s) Oral at bedtime  metroNIDAZOLE  IVPB 500 milliGRAM(s) IV Intermittent every 8 hours    MEDICATIONS  (PRN):  HYDROmorphone  Injectable 0.5 milliGRAM(s) IV Push every 4 hours PRN Severe Pain (7 - 10)  ondansetron Injectable 4 milliGRAM(s) IV Push every 6 hours PRN Nausea and/or Vomiting    PAST MEDICAL & SURGICAL HISTORY:  Crohn disease    FAMILY HISTORY:    SOCIAL HISTORY:  [place]: [ ] Current, [ ] Former, [ ] Never; Pack Years:  Alcohol:  Illicit Drugs:    REVIEW OF SYSTEMS:  CONSTITUTIONAL: No weakness, fevers or chills  HEENT: No visual changes; No vertigo or throat pain   NECK: No pain or stiffness  RESPIRATORY: No cough, wheezing, hemoptysis; No shortness of breath  CARDIOVASCULAR: No chest pain or palpitations  GASTROINTESTINAL: No abdominal or epigastric pain. No nausea, vomiting, or hematemesis; No diarrhea or constipation. No melena or hematochezia.  GENITOURINARY: No dysuria, frequency or hematuria  NEUROLOGICAL: No numbness or weakness  SKIN: No itching, burning, rashes, or lesions   All other 10 review of systems is negative unless indicated above.    Vital Signs Last 24 Hrs  T(C): 36.7 (15 Jul 2020 16:10), Max: 36.9 (15 Jul 2020 05:20)  T(F): 98.1 (15 Jul 2020 16:10), Max: 98.5 (15 Jul 2020 05:20)  HR: 76 (15 Jul 2020 16:10) (70 - 85)  BP: 113/74 (15 Jul 2020 16:10) (98/62 - 125/64)  BP(mean): 87 (15 Jul 2020 16:10) (87 - 87)  RR: 17 (15 Jul 2020 16:10) (17 - 20)  SpO2: 96% (15 Jul 2020 16:10) (94% - 98%)    07-14 @ 07:01  -  07-15 @ 07:00  --------------------------------------------------------  IN: 1134 mL / OUT: 1195 mL / NET: -61 mL    07-15 @ 07:01  -  07-15 @ 16:46  --------------------------------------------------------  IN: 1400 mL / OUT: 125 mL / NET: 1275 mL        PHYSICAL EXAM:    General: Well developed; well nourished; in no acute distress  Eyes: Anicteric sclerae, moist conjunctivae  HENT: Moist mucous membranes  Neck: Trachea midline, supple  Lungs: Normal respiratory effors and no intercostal retractions  Cardiovascular: RRR  Abdomen: Soft, non-tender non-distended; Normal bowel sounds; No rebound or guarding  Extremities: Normal range of motion, No clubbing, cyanosis or edema  Neurological: Alert and oriented x3  Skin: Warm and dry. No obvious rash    LABS:                        10.3   12.88 )-----------( 279      ( 15 Jul 2020 07:50 )             32.8     07-15    137  |  107  |  10  ----------------------------<  108<H>  4.5   |  22  |  0.78    Ca    8.1<L>      15 Jul 2020 07:50  Phos  2.8     07-15  Mg     2.2     07-15              RADIOLOGY & ADDITIONAL STUDIES: HPI:  34 year old female with PMH of Crohn's disease and bladder abscess POD 1 of elective ileo colic resection. As outpatient, she was on prednisone 10 mg daily, which she stopped one week before surgery. She was also on budesonide 9 mg daily.    Since surgery, she reports abd pain at incision site. She is tolerating sips of water and recently had one bm, which she describes as dark     Since procedure   Allergies    No Known Allergies    Intolerances      Home Medications:    MEDICATIONS:  MEDICATIONS  (STANDING):  acetaminophen   Tablet .. 1000 milliGRAM(s) Oral every 6 hours  BUpivacaine liposome 1.3% Injectable (no eMAR) 20 milliLiter(s) Local Injection once  cefTRIAXone   IVPB 1000 milliGRAM(s) IV Intermittent every 24 hours  hydrocortisone sodium succinate Injectable 50 milliGRAM(s) IV Push every 8 hours  ketorolac   Injectable 15 milliGRAM(s) IV Push every 6 hours  lactated ringers. 1000 milliLiter(s) (40 mL/Hr) IV Continuous <Continuous>  melatonin 5 milliGRAM(s) Oral at bedtime  metroNIDAZOLE  IVPB 500 milliGRAM(s) IV Intermittent every 8 hours    MEDICATIONS  (PRN):  HYDROmorphone  Injectable 0.5 milliGRAM(s) IV Push every 4 hours PRN Severe Pain (7 - 10)  ondansetron Injectable 4 milliGRAM(s) IV Push every 6 hours PRN Nausea and/or Vomiting    PAST MEDICAL & SURGICAL HISTORY:  Crohn disease    FAMILY HISTORY:    SOCIAL HISTORY:  [place]: denies  Alcohol: denies  Illicit Drugs: denies     REVIEW OF SYSTEMS:  CONSTITUTIONAL: No weakness, fevers or chills  HEENT: No visual changes; No vertigo or throat pain   NECK: No pain or stiffness  RESPIRATORY: No cough, wheezing, hemoptysis; No shortness of breath  CARDIOVASCULAR: No chest pain or palpitations  GASTROINTESTINAL: + abd pain   GENITOURINARY: No dysuria, frequency or hematuria  NEUROLOGICAL: No numbness or weakness  SKIN: No itching, burning, rashes, or lesions   All other 10 review of systems is negative unless indicated above.    Vital Signs Last 24 Hrs  T(C): 36.7 (15 Jul 2020 16:10), Max: 36.9 (15 Jul 2020 05:20)  T(F): 98.1 (15 Jul 2020 16:10), Max: 98.5 (15 Jul 2020 05:20)  HR: 76 (15 Jul 2020 16:10) (70 - 85)  BP: 113/74 (15 Jul 2020 16:10) (98/62 - 125/64)  BP(mean): 87 (15 Jul 2020 16:10) (87 - 87)  RR: 17 (15 Jul 2020 16:10) (17 - 20)  SpO2: 96% (15 Jul 2020 16:10) (94% - 98%)    07-14 @ 07:01  -  07-15 @ 07:00  --------------------------------------------------------  IN: 1134 mL / OUT: 1195 mL / NET: -61 mL    07-15 @ 07:01  -  07-15 @ 16:46  --------------------------------------------------------  IN: 1400 mL / OUT: 125 mL / NET: 1275 mL        PHYSICAL EXAM:    General: in no acute distress  Eyes: Anicteric sclerae, moist conjunctivae  HENT: Moist mucous membranes  Neck: Trachea midline, supple  Lungs: Normal respiratory effors and no intercostal retractions  Cardiovascular: RRR  Abdomen: Soft, mildly distended, tender at incision site, incision site c/d/i  Extremities: Normal range of motion, No clubbing, cyanosis or edema  Neurological: Alert and oriented x3  Skin: Warm and dry. No obvious rash    LABS:                        10.3   12.88 )-----------( 279      ( 15 Jul 2020 07:50 )             32.8     07-15    137  |  107  |  10  ----------------------------<  108<H>  4.5   |  22  |  0.78    Ca    8.1<L>      15 Jul 2020 07:50  Phos  2.8     07-15  Mg     2.2     07-15              RADIOLOGY & ADDITIONAL STUDIES: HPI:  34 year old female with PMH of Crohn's disease and bladder abscess POD 1 of elective ileo colic resection. As outpatient, she was on prednisone 10 mg daily, which she stopped one week before surgery, as well as budesonide 9 mg daily. Since surgery, she reports abd pain at incision site. She is tolerating sips of water and recently had one bm, which she describes as dark in color.   She is eager to discuss future treatment options of CD.     Since procedure   Allergies    No Known Allergies    Intolerances      Home Medications:    MEDICATIONS:  MEDICATIONS  (STANDING):  acetaminophen   Tablet .. 1000 milliGRAM(s) Oral every 6 hours  BUpivacaine liposome 1.3% Injectable (no eMAR) 20 milliLiter(s) Local Injection once  cefTRIAXone   IVPB 1000 milliGRAM(s) IV Intermittent every 24 hours  hydrocortisone sodium succinate Injectable 50 milliGRAM(s) IV Push every 8 hours  ketorolac   Injectable 15 milliGRAM(s) IV Push every 6 hours  lactated ringers. 1000 milliLiter(s) (40 mL/Hr) IV Continuous <Continuous>  melatonin 5 milliGRAM(s) Oral at bedtime  metroNIDAZOLE  IVPB 500 milliGRAM(s) IV Intermittent every 8 hours    MEDICATIONS  (PRN):  HYDROmorphone  Injectable 0.5 milliGRAM(s) IV Push every 4 hours PRN Severe Pain (7 - 10)  ondansetron Injectable 4 milliGRAM(s) IV Push every 6 hours PRN Nausea and/or Vomiting    PAST MEDICAL & SURGICAL HISTORY:  Crohn disease    FAMILY HISTORY: Denies     SOCIAL HISTORY:  [place]: denies  Alcohol: denies  Illicit Drugs: denies     REVIEW OF SYSTEMS:  CONSTITUTIONAL: No weakness, fevers or chills  HEENT: No visual changes; No vertigo or throat pain   NECK: No pain or stiffness  RESPIRATORY: No cough, wheezing, hemoptysis; No shortness of breath  CARDIOVASCULAR: No chest pain or palpitations  GASTROINTESTINAL: + abd pain   GENITOURINARY: No dysuria, frequency or hematuria  NEUROLOGICAL: No numbness or weakness  SKIN: No itching, burning, rashes, or lesions   All other 10 review of systems is negative unless indicated above.    Vital Signs Last 24 Hrs  T(C): 36.7 (15 Jul 2020 16:10), Max: 36.9 (15 Jul 2020 05:20)  T(F): 98.1 (15 Jul 2020 16:10), Max: 98.5 (15 Jul 2020 05:20)  HR: 76 (15 Jul 2020 16:10) (70 - 85)  BP: 113/74 (15 Jul 2020 16:10) (98/62 - 125/64)  BP(mean): 87 (15 Jul 2020 16:10) (87 - 87)  RR: 17 (15 Jul 2020 16:10) (17 - 20)  SpO2: 96% (15 Jul 2020 16:10) (94% - 98%)    07-14 @ 07:01  -  07-15 @ 07:00  --------------------------------------------------------  IN: 1134 mL / OUT: 1195 mL / NET: -61 mL    07-15 @ 07:01  -  07-15 @ 16:46  --------------------------------------------------------  IN: 1400 mL / OUT: 125 mL / NET: 1275 mL        PHYSICAL EXAM:    General: in no acute distress  Eyes: Anicteric sclerae, moist conjunctivae  HENT: Moist mucous membranes  Neck: Trachea midline, supple  Lungs: Normal respiratory effors and no intercostal retractions  Cardiovascular: RRR  Abdomen: Soft, mildly distended, tender at incision site, incision site c/d/i  Extremities: Normal range of motion, No clubbing, cyanosis or edema  Neurological: Alert and oriented x3  Skin: Warm and dry. No obvious rash    LABS:                        10.3   12.88 )-----------( 279      ( 15 Jul 2020 07:50 )             32.8     07-15    137  |  107  |  10  ----------------------------<  108<H>  4.5   |  22  |  0.78    Ca    8.1<L>      15 Jul 2020 07:50  Phos  2.8     07-15  Mg     2.2     07-15              RADIOLOGY & ADDITIONAL STUDIES:

## 2020-07-15 NOTE — PROGRESS NOTE ADULT - SUBJECTIVE AND OBJECTIVE BOX
SUBJECTIVE: Patient seen and examined bedside. Patient reports upper abdominal pain. Patient denies nausea and vomiting. Patient denies passing flatus or having bowel movements. Patient has had minimal clear liquids to drink and is tolerating.    cefTRIAXone   IVPB 1000 milliGRAM(s) IV Intermittent every 24 hours  metroNIDAZOLE  IVPB 500 milliGRAM(s) IV Intermittent every 8 hours      Vital Signs Last 24 Hrs  T(C): 36.9 (15 Jul 2020 05:20), Max: 36.9 (15 Jul 2020 05:20)  T(F): 98.5 (15 Jul 2020 05:20), Max: 98.5 (15 Jul 2020 05:20)  HR: 74 (15 Jul 2020 05:44) (70 - 103)  BP: 103/66 (15 Jul 2020 05:44) (98/62 - 135/62)  BP(mean): 84 (14 Jul 2020 16:15) (72 - 89)  RR: 17 (15 Jul 2020 05:20) (13 - 24)  SpO2: 97% (15 Jul 2020 05:20) (94% - 100%)  I&O's Detail    14 Jul 2020 07:01  -  15 Jul 2020 07:00  --------------------------------------------------------  IN:    IV PiggyBack: 250 mL    lactated ringers.: 884 mL  Total IN: 1134 mL    OUT:    Indwelling Catheter - Urethral: 1195 mL  Total OUT: 1195 mL    Total NET: -61 mL          General: NAD, resting comfortably in bed  C/V: NSR  Pulm: Nonlabored breathing, no respiratory distress  Abd: soft, minimally distended, tender to palpation near surgical incisions. surgical incisions clean, dry ,and intact.  Extrem: WWP, no edema, SCDs in place        LABS:                        12.1   16.95 )-----------( 318      ( 14 Jul 2020 14:58 )             37.9     07-14    140  |  108  |  11  ----------------------------<  171<H>  4.4   |  19<L>  |  1.09    Ca    8.1<L>      14 Jul 2020 14:58  Phos  3.1     07-14  Mg     2.8     07-14            RADIOLOGY & ADDITIONAL STUDIES:

## 2020-07-16 LAB
ANION GAP SERPL CALC-SCNC: 8 MMOL/L — SIGNIFICANT CHANGE UP (ref 5–17)
BUN SERPL-MCNC: 11 MG/DL — SIGNIFICANT CHANGE UP (ref 7–23)
CALCIUM SERPL-MCNC: 8.3 MG/DL — LOW (ref 8.4–10.5)
CHLORIDE SERPL-SCNC: 106 MMOL/L — SIGNIFICANT CHANGE UP (ref 96–108)
CO2 SERPL-SCNC: 24 MMOL/L — SIGNIFICANT CHANGE UP (ref 22–31)
CREAT SERPL-MCNC: 0.7 MG/DL — SIGNIFICANT CHANGE UP (ref 0.5–1.3)
GLUCOSE SERPL-MCNC: 105 MG/DL — HIGH (ref 70–99)
HCT VFR BLD CALC: 30.3 % — LOW (ref 34.5–45)
HGB BLD-MCNC: 9.2 G/DL — LOW (ref 11.5–15.5)
MAGNESIUM SERPL-MCNC: 2 MG/DL — SIGNIFICANT CHANGE UP (ref 1.6–2.6)
MCHC RBC-ENTMCNC: 26.7 PG — LOW (ref 27–34)
MCHC RBC-ENTMCNC: 30.4 GM/DL — LOW (ref 32–36)
MCV RBC AUTO: 88.1 FL — SIGNIFICANT CHANGE UP (ref 80–100)
NRBC # BLD: 0 /100 WBCS — SIGNIFICANT CHANGE UP (ref 0–0)
PHOSPHATE SERPL-MCNC: 3.1 MG/DL — SIGNIFICANT CHANGE UP (ref 2.5–4.5)
PLATELET # BLD AUTO: 256 K/UL — SIGNIFICANT CHANGE UP (ref 150–400)
POTASSIUM SERPL-MCNC: 5 MMOL/L — SIGNIFICANT CHANGE UP (ref 3.5–5.3)
POTASSIUM SERPL-SCNC: 5 MMOL/L — SIGNIFICANT CHANGE UP (ref 3.5–5.3)
RBC # BLD: 3.44 M/UL — LOW (ref 3.8–5.2)
RBC # FLD: 13.9 % — SIGNIFICANT CHANGE UP (ref 10.3–14.5)
SODIUM SERPL-SCNC: 138 MMOL/L — SIGNIFICANT CHANGE UP (ref 135–145)
WBC # BLD: 12.02 K/UL — HIGH (ref 3.8–10.5)
WBC # FLD AUTO: 12.02 K/UL — HIGH (ref 3.8–10.5)

## 2020-07-16 PROCEDURE — 99232 SBSQ HOSP IP/OBS MODERATE 35: CPT

## 2020-07-16 RX ORDER — PANTOPRAZOLE SODIUM 20 MG/1
40 TABLET, DELAYED RELEASE ORAL ONCE
Refills: 0 | Status: COMPLETED | OUTPATIENT
Start: 2020-07-16 | End: 2020-07-16

## 2020-07-16 RX ORDER — METRONIDAZOLE 500 MG
500 TABLET ORAL EVERY 8 HOURS
Refills: 0 | Status: DISCONTINUED | OUTPATIENT
Start: 2020-07-16 | End: 2020-07-18

## 2020-07-16 RX ORDER — HYDROCORTISONE 20 MG
50 TABLET ORAL ONCE
Refills: 0 | Status: COMPLETED | OUTPATIENT
Start: 2020-07-17 | End: 2020-07-17

## 2020-07-16 RX ORDER — DIAZEPAM 5 MG
5 TABLET ORAL ONCE
Refills: 0 | Status: DISCONTINUED | OUTPATIENT
Start: 2020-07-16 | End: 2020-07-16

## 2020-07-16 RX ORDER — HYDROCORTISONE 20 MG
50 TABLET ORAL ONCE
Refills: 0 | Status: COMPLETED | OUTPATIENT
Start: 2020-07-16 | End: 2020-07-16

## 2020-07-16 RX ADMIN — Medication 5 MILLIGRAM(S): at 23:14

## 2020-07-16 RX ADMIN — Medication 15 MILLIGRAM(S): at 18:15

## 2020-07-16 RX ADMIN — Medication 1000 MILLIGRAM(S): at 23:14

## 2020-07-16 RX ADMIN — HYDROMORPHONE HYDROCHLORIDE 0.5 MILLIGRAM(S): 2 INJECTION INTRAMUSCULAR; INTRAVENOUS; SUBCUTANEOUS at 10:20

## 2020-07-16 RX ADMIN — Medication 50 MILLIGRAM(S): at 07:08

## 2020-07-16 RX ADMIN — CEFTRIAXONE 100 MILLIGRAM(S): 500 INJECTION, POWDER, FOR SOLUTION INTRAMUSCULAR; INTRAVENOUS at 16:16

## 2020-07-16 RX ADMIN — Medication 1000 MILLIGRAM(S): at 18:03

## 2020-07-16 RX ADMIN — Medication 1000 MILLIGRAM(S): at 12:15

## 2020-07-16 RX ADMIN — Medication 15 MILLIGRAM(S): at 05:06

## 2020-07-16 RX ADMIN — Medication 15 MILLIGRAM(S): at 11:45

## 2020-07-16 RX ADMIN — Medication 50 MILLIGRAM(S): at 00:06

## 2020-07-16 RX ADMIN — PANTOPRAZOLE SODIUM 40 MILLIGRAM(S): 20 TABLET, DELAYED RELEASE ORAL at 21:28

## 2020-07-16 RX ADMIN — Medication 100 MILLIGRAM(S): at 21:28

## 2020-07-16 RX ADMIN — Medication 1000 MILLIGRAM(S): at 05:58

## 2020-07-16 RX ADMIN — HYDROMORPHONE HYDROCHLORIDE 0.5 MILLIGRAM(S): 2 INJECTION INTRAMUSCULAR; INTRAVENOUS; SUBCUTANEOUS at 15:47

## 2020-07-16 RX ADMIN — Medication 1000 MILLIGRAM(S): at 00:07

## 2020-07-16 RX ADMIN — Medication 5 MILLIGRAM(S): at 21:28

## 2020-07-16 RX ADMIN — HYDROMORPHONE HYDROCHLORIDE 0.5 MILLIGRAM(S): 2 INJECTION INTRAMUSCULAR; INTRAVENOUS; SUBCUTANEOUS at 05:39

## 2020-07-16 RX ADMIN — Medication 1000 MILLIGRAM(S): at 05:07

## 2020-07-16 RX ADMIN — Medication 1000 MILLIGRAM(S): at 11:45

## 2020-07-16 RX ADMIN — Medication 15 MILLIGRAM(S): at 05:35

## 2020-07-16 RX ADMIN — Medication 50 MILLIGRAM(S): at 18:02

## 2020-07-16 RX ADMIN — HYDROMORPHONE HYDROCHLORIDE 0.5 MILLIGRAM(S): 2 INJECTION INTRAMUSCULAR; INTRAVENOUS; SUBCUTANEOUS at 05:06

## 2020-07-16 RX ADMIN — Medication 15 MILLIGRAM(S): at 18:03

## 2020-07-16 RX ADMIN — HYDROMORPHONE HYDROCHLORIDE 0.5 MILLIGRAM(S): 2 INJECTION INTRAMUSCULAR; INTRAVENOUS; SUBCUTANEOUS at 10:35

## 2020-07-16 RX ADMIN — HYDROMORPHONE HYDROCHLORIDE 0.5 MILLIGRAM(S): 2 INJECTION INTRAMUSCULAR; INTRAVENOUS; SUBCUTANEOUS at 21:58

## 2020-07-16 RX ADMIN — ONDANSETRON 4 MILLIGRAM(S): 8 TABLET, FILM COATED ORAL at 13:44

## 2020-07-16 RX ADMIN — Medication 1000 MILLIGRAM(S): at 18:43

## 2020-07-16 RX ADMIN — HYDROMORPHONE HYDROCHLORIDE 0.5 MILLIGRAM(S): 2 INJECTION INTRAMUSCULAR; INTRAVENOUS; SUBCUTANEOUS at 21:28

## 2020-07-16 RX ADMIN — Medication 15 MILLIGRAM(S): at 12:00

## 2020-07-16 NOTE — PROGRESS NOTE ADULT - ASSESSMENT
34 year old female with PMH of Crohn's disease and possible bladder abscess presents today for elective ileo colic resection now s/p laparoscopic robot assisted right hemicolectomy on 7/14.    Pain/nausea control prn  Diet: CLD  IVF: LR 40cc/hr  hydrocortisone 50mg q  8 x 24hrs, then 50mg q12  Per urology-laguerre to stay for ten days postoperatively  SQH/SCDs/OOBA/IS  AM labs

## 2020-07-16 NOTE — PROGRESS NOTE ADULT - SUBJECTIVE AND OBJECTIVE BOX
INTERVAL HPI/OVERNIGHT EVENTS: UOP improved after given 1L bolus, RADHA, VSS    STATUS POST:  laparoscopic robot assisted right hemicolectomy 7/14    POST OPERATIVE DAY #: 2    SUBJECTIVE: Pt seen and examined at bedside this am by surgery team. Pt reports feeling full w/ diet and slight abd distension. No vomiting or emesis. Overall pain well controlled. -F/+BM. Last BM yesterday. Denies f/n/v/cp/sob.    MEDICATIONS  (STANDING):  acetaminophen   Tablet .. 1000 milliGRAM(s) Oral every 6 hours  BUpivacaine liposome 1.3% Injectable (no eMAR) 20 milliLiter(s) Local Injection once  cefTRIAXone   IVPB 1000 milliGRAM(s) IV Intermittent every 24 hours  hydrocortisone sodium succinate Injectable 50 milliGRAM(s) IV Push once  ketorolac   Injectable 15 milliGRAM(s) IV Push every 6 hours  lactated ringers. 1000 milliLiter(s) (40 mL/Hr) IV Continuous <Continuous>  melatonin 5 milliGRAM(s) Oral at bedtime  metroNIDAZOLE  IVPB 500 milliGRAM(s) IV Intermittent every 8 hours    MEDICATIONS  (PRN):  HYDROmorphone  Injectable 0.5 milliGRAM(s) IV Push every 4 hours PRN Severe Pain (7 - 10)  ondansetron Injectable 4 milliGRAM(s) IV Push every 6 hours PRN Nausea and/or Vomiting      Vital Signs Last 24 Hrs  T(C): 37 (16 Jul 2020 05:25), Max: 37.1 (15 Jul 2020 20:38)  T(F): 98.6 (16 Jul 2020 05:25), Max: 98.7 (15 Jul 2020 20:38)  HR: 63 (16 Jul 2020 05:25) (63 - 81)  BP: 105/72 (16 Jul 2020 05:25) (103/68 - 115/72)  BP(mean): 87 (15 Jul 2020 16:10) (87 - 87)  RR: 17 (16 Jul 2020 05:25) (17 - 18)  SpO2: 96% (16 Jul 2020 05:25) (96% - 98%)    PHYSICAL EXAM:      Constitutional: A&Ox3, NAD    Respiratory: non labored breathing, no respiratory distress    Cardiovascular: NSR, RRR    Gastrointestinal: abdomen soft, nd, appropriately ttp to incisions. Dressings c/d/i.    Genitourinary: Sapp in place     Extremities: wwp, no calf tenderness or edema. SCDs in place       I&O's Detail    15 Jul 2020 07:01  -  16 Jul 2020 07:00  --------------------------------------------------------  IN:    Lactated Ringers IV Bolus: 1000 mL    lactated ringers.: 1000 mL  Total IN: 2000 mL    OUT:    Indwelling Catheter - Urethral: 925 mL  Total OUT: 925 mL    Total NET: 1075 mL          LABS:                        9.2    12.02 )-----------( 256      ( 16 Jul 2020 06:06 )             30.3     07-16    138  |  106  |  11  ----------------------------<  105<H>  5.0   |  24  |  0.70    Ca    8.3<L>      16 Jul 2020 06:06  Phos  3.1     07-16  Mg     2.0     07-16            RADIOLOGY & ADDITIONAL STUDIES:

## 2020-07-16 NOTE — PROGRESS NOTE ADULT - ATTENDING COMMENTS
her "abdominal pain" is really L sided back pain from lying on the bed - suggest flexaril.   Agree with steroid taper as described above.  Expect her GERD complaints to resolve with steroid weaning.  Still some complaints of black stool and I suspect c/w old ooze at anastamosis site and expect that to resolve.  But reasonable to add a PPI while waiting that out to be certainly no addl contribution from UGI tract.  She should have a f/u with Dr. Miranda (out pt GI) ~3wks post d/c and she knows we are available to her as well.

## 2020-07-16 NOTE — PROGRESS NOTE ADULT - ASSESSMENT
34 year old female with PMH of Crohn's disease and bladder abscess POD 2 of elective ileo colic resection    -Patient is passing flatus and having BM  -C/w steroid taper: hydrocortisone 100 mg today,  followed by hydrocortisone 50 mg. She can then resume her home dose of prednisone 10 mg daily. She will follow up with Dr. Torrez as outpatient to taper steroids and discuss medical therapy for CD  -appreciate excellent surgical care  -pain control  -continue with diet as tolerated  -laguerre in place, to be removed in ten days      Case discussed with Dr. Torrez 34 year old female with PMH of Crohn's disease and bladder abscess POD 2 of elective ileo colic resection    -Patient is passing flatus and having BM  -C/w steroid taper: hydrocortisone 100 mg today,  followed by hydrocortisone 50 mg tomorrow. Afterwards, she can then resume her home dose of prednisone 10 mg daily. She will follow up with Dr. Torrez as outpatient to taper steroids and discuss medical therapy for CD  -appreciate excellent surgical care  -pain control  -continue with diet as tolerated  -laguerre in place, to be removed in ten days per urology       Case discussed with Dr. Torrez 34 year old female with PMH of Crohn's disease and bladder abscess POD 2 of elective ileo colic resection    -Patient is passing flatus and having BM  -C/w steroid taper: hydrocortisone 100 mg today,  followed by hydrocortisone 50 mg tomorrow. Afterwards, she can then resume her home dose of prednisone 10 mg daily. She will follow up with Dr. Torrez as outpatient to taper steroids and discuss medical therapy for CD  - She reports dark bm, will monitor H/H, but likely all 2.2 surgical blood loss   -her right sided back pain is likely musculoskeletal, consider flexeril   -appreciate excellent surgical care  -pain control  -continue with diet as tolerated  -laguerre in place, to be removed in ten days per urology       Case discussed with Dr. Torrez 34 year old female with PMH of Crohn's disease and bladder abscess POD 2 of elective ileo colic resection    -Patient is passing flatus and having BM  - She reports dark bm, will monitor H/H, but likely all 2.2 surgical blood loss   -C/w steroid taper: hydrocortisone 100 mg today,  followed by hydrocortisone 50 mg tomorrow. Afterwards, she can then resume her home dose of prednisone 10 mg daily. She will follow up with Dr. Torrez as outpatient to taper steroids and discuss medical therapy for CD  -her right sided back pain is likely musculoskeletal, consider flexeril   -appreciate excellent surgical care  -pain control  -continue with diet as tolerated  -laguerre in place, to be removed in ten days per urology       Case discussed with Dr. Torrez

## 2020-07-16 NOTE — PROGRESS NOTE ADULT - SUBJECTIVE AND OBJECTIVE BOX
Pt seen and examined at bedside. She walked the parker twice today. Complains of abd discomfort, mainly right side. Some nausea, which improved with BM. She had one other bm today. Tolerated chicken broth.     PERTINENT REVIEW OF SYSTEMS:  CONSTITUTIONAL: No weakness, fevers or chills  HEENT: No visual changes; No vertigo or throat pain   GASTROINTESTINAL: No abdominal or epigastric pain. No nausea, vomiting, or hematemesis; No diarrhea or constipation. No melena or hematochezia.  NEUROLOGICAL: No numbness or weakness  SKIN: No itching, burning, rashes, or lesions     Allergies    No Known Allergies    Intolerances      MEDICATIONS:  MEDICATIONS  (STANDING):  acetaminophen   Tablet .. 1000 milliGRAM(s) Oral every 6 hours  BUpivacaine liposome 1.3% Injectable (no eMAR) 20 milliLiter(s) Local Injection once  cefTRIAXone   IVPB 1000 milliGRAM(s) IV Intermittent every 24 hours  hydrocortisone sodium succinate Injectable 50 milliGRAM(s) IV Push once  ketorolac   Injectable 15 milliGRAM(s) IV Push every 6 hours  lactated ringers. 1000 milliLiter(s) (40 mL/Hr) IV Continuous <Continuous>  melatonin 5 milliGRAM(s) Oral at bedtime    MEDICATIONS  (PRN):  HYDROmorphone  Injectable 0.5 milliGRAM(s) IV Push every 4 hours PRN Severe Pain (7 - 10)  ondansetron Injectable 4 milliGRAM(s) IV Push every 6 hours PRN Nausea and/or Vomiting    Vital Signs Last 24 Hrs  T(C): 37.1 (16 Jul 2020 16:16), Max: 37.1 (15 Jul 2020 20:38)  T(F): 98.7 (16 Jul 2020 16:16), Max: 98.7 (15 Jul 2020 20:38)  HR: 78 (16 Jul 2020 16:16) (62 - 84)  BP: 113/75 (16 Jul 2020 16:16) (103/68 - 113/75)  BP(mean): 88 (16 Jul 2020 16:16) (88 - 88)  RR: 17 (16 Jul 2020 16:16) (17 - 18)  SpO2: 95% (16 Jul 2020 16:16) (95% - 98%)    07-15 @ 07:01  -  07-16 @ 07:00  --------------------------------------------------------  IN: 2000 mL / OUT: 925 mL / NET: 1075 mL    07-16 @ 07:01  -  07-16 @ 17:07  --------------------------------------------------------  IN: 660 mL / OUT: 0 mL / NET: 660 mL      PHYSICAL EXAM:    General: Well developed; well nourished; in no acute distress  HEENT: MMM, conjunctiva and sclera clear  Gastrointestinal: Soft, slightly distended, tender near incisions, which appear c/d/i  Skin: Warm and dry. No obvious rash    LABS:                        9.2    12.02 )-----------( 256      ( 16 Jul 2020 06:06 )             30.3     07-16    138  |  106  |  11  ----------------------------<  105<H>  5.0   |  24  |  0.70    Ca    8.3<L>      16 Jul 2020 06:06  Phos  3.1     07-16  Mg     2.0     07-16                        RADIOLOGY & ADDITIONAL STUDIES: Pt seen and examined at bedside. She walked the halls twice today. Complains of abd discomfort, mainly right side. Some nausea, which improved with zofran. She had one bm today. Tolerated chicken broth.     PERTINENT REVIEW OF SYSTEMS:  CONSTITUTIONAL: No weakness, fevers or chills  HEENT: No visual changes; No vertigo or throat pain   GASTROINTESTINAL: + abd discomfort   NEUROLOGICAL: No numbness or weakness  SKIN: No itching, burning, rashes, or lesions     Allergies    No Known Allergies    Intolerances      MEDICATIONS:  MEDICATIONS  (STANDING):  acetaminophen   Tablet .. 1000 milliGRAM(s) Oral every 6 hours  BUpivacaine liposome 1.3% Injectable (no eMAR) 20 milliLiter(s) Local Injection once  cefTRIAXone   IVPB 1000 milliGRAM(s) IV Intermittent every 24 hours  hydrocortisone sodium succinate Injectable 50 milliGRAM(s) IV Push once  ketorolac   Injectable 15 milliGRAM(s) IV Push every 6 hours  lactated ringers. 1000 milliLiter(s) (40 mL/Hr) IV Continuous <Continuous>  melatonin 5 milliGRAM(s) Oral at bedtime    MEDICATIONS  (PRN):  HYDROmorphone  Injectable 0.5 milliGRAM(s) IV Push every 4 hours PRN Severe Pain (7 - 10)  ondansetron Injectable 4 milliGRAM(s) IV Push every 6 hours PRN Nausea and/or Vomiting    Vital Signs Last 24 Hrs  T(C): 37.1 (16 Jul 2020 16:16), Max: 37.1 (15 Jul 2020 20:38)  T(F): 98.7 (16 Jul 2020 16:16), Max: 98.7 (15 Jul 2020 20:38)  HR: 78 (16 Jul 2020 16:16) (62 - 84)  BP: 113/75 (16 Jul 2020 16:16) (103/68 - 113/75)  BP(mean): 88 (16 Jul 2020 16:16) (88 - 88)  RR: 17 (16 Jul 2020 16:16) (17 - 18)  SpO2: 95% (16 Jul 2020 16:16) (95% - 98%)    07-15 @ 07:01  -  07-16 @ 07:00  --------------------------------------------------------  IN: 2000 mL / OUT: 925 mL / NET: 1075 mL    07-16 @ 07:01  -  07-16 @ 17:07  --------------------------------------------------------  IN: 660 mL / OUT: 0 mL / NET: 660 mL      PHYSICAL EXAM:    General: Well developed; well nourished; in no acute distress  HEENT: MMM, conjunctiva and sclera clear  Gastrointestinal: Soft, slightly distended, tender near incisions, which appear c/d/i  Skin: Warm and dry. No obvious rash    LABS:                        9.2    12.02 )-----------( 256      ( 16 Jul 2020 06:06 )             30.3     07-16    138  |  106  |  11  ----------------------------<  105<H>  5.0   |  24  |  0.70    Ca    8.3<L>      16 Jul 2020 06:06  Phos  3.1     07-16  Mg     2.0     07-16                        RADIOLOGY & ADDITIONAL STUDIES:

## 2020-07-17 LAB
ANION GAP SERPL CALC-SCNC: 9 MMOL/L — SIGNIFICANT CHANGE UP (ref 5–17)
BUN SERPL-MCNC: 11 MG/DL — SIGNIFICANT CHANGE UP (ref 7–23)
CALCIUM SERPL-MCNC: 8.1 MG/DL — LOW (ref 8.4–10.5)
CHLORIDE SERPL-SCNC: 106 MMOL/L — SIGNIFICANT CHANGE UP (ref 96–108)
CO2 SERPL-SCNC: 23 MMOL/L — SIGNIFICANT CHANGE UP (ref 22–31)
CREAT SERPL-MCNC: 0.76 MG/DL — SIGNIFICANT CHANGE UP (ref 0.5–1.3)
GLUCOSE SERPL-MCNC: 88 MG/DL — SIGNIFICANT CHANGE UP (ref 70–99)
HCT VFR BLD CALC: 28.8 % — LOW (ref 34.5–45)
HGB BLD-MCNC: 8.8 G/DL — LOW (ref 11.5–15.5)
MAGNESIUM SERPL-MCNC: 1.8 MG/DL — SIGNIFICANT CHANGE UP (ref 1.6–2.6)
MCHC RBC-ENTMCNC: 26.8 PG — LOW (ref 27–34)
MCHC RBC-ENTMCNC: 30.6 GM/DL — LOW (ref 32–36)
MCV RBC AUTO: 87.8 FL — SIGNIFICANT CHANGE UP (ref 80–100)
NRBC # BLD: 0 /100 WBCS — SIGNIFICANT CHANGE UP (ref 0–0)
PHOSPHATE SERPL-MCNC: 2.9 MG/DL — SIGNIFICANT CHANGE UP (ref 2.5–4.5)
PLATELET # BLD AUTO: 259 K/UL — SIGNIFICANT CHANGE UP (ref 150–400)
POTASSIUM SERPL-MCNC: 4.2 MMOL/L — SIGNIFICANT CHANGE UP (ref 3.5–5.3)
POTASSIUM SERPL-SCNC: 4.2 MMOL/L — SIGNIFICANT CHANGE UP (ref 3.5–5.3)
RBC # BLD: 3.28 M/UL — LOW (ref 3.8–5.2)
RBC # FLD: 14.1 % — SIGNIFICANT CHANGE UP (ref 10.3–14.5)
SODIUM SERPL-SCNC: 138 MMOL/L — SIGNIFICANT CHANGE UP (ref 135–145)
WBC # BLD: 8.79 K/UL — SIGNIFICANT CHANGE UP (ref 3.8–10.5)
WBC # FLD AUTO: 8.79 K/UL — SIGNIFICANT CHANGE UP (ref 3.8–10.5)

## 2020-07-17 RX ORDER — KETOROLAC TROMETHAMINE 30 MG/ML
10 SYRINGE (ML) INJECTION ONCE
Refills: 0 | Status: DISCONTINUED | OUTPATIENT
Start: 2020-07-17 | End: 2020-07-17

## 2020-07-17 RX ORDER — ENOXAPARIN SODIUM 100 MG/ML
40 INJECTION SUBCUTANEOUS EVERY 24 HOURS
Refills: 0 | Status: DISCONTINUED | OUTPATIENT
Start: 2020-07-17 | End: 2020-07-19

## 2020-07-17 RX ORDER — KETOROLAC TROMETHAMINE 30 MG/ML
15 SYRINGE (ML) INJECTION EVERY 8 HOURS
Refills: 0 | Status: DISCONTINUED | OUTPATIENT
Start: 2020-07-17 | End: 2020-07-19

## 2020-07-17 RX ORDER — CHLORHEXIDINE GLUCONATE 213 G/1000ML
1 SOLUTION TOPICAL DAILY
Refills: 0 | Status: DISCONTINUED | OUTPATIENT
Start: 2020-07-17 | End: 2020-07-17

## 2020-07-17 RX ORDER — SODIUM CHLORIDE 9 MG/ML
500 INJECTION, SOLUTION INTRAVENOUS ONCE
Refills: 0 | Status: DISCONTINUED | OUTPATIENT
Start: 2020-07-17 | End: 2020-07-17

## 2020-07-17 RX ORDER — DIAZEPAM 5 MG
5 TABLET ORAL ONCE
Refills: 0 | Status: DISCONTINUED | OUTPATIENT
Start: 2020-07-17 | End: 2020-07-17

## 2020-07-17 RX ORDER — PANTOPRAZOLE SODIUM 20 MG/1
40 TABLET, DELAYED RELEASE ORAL ONCE
Refills: 0 | Status: COMPLETED | OUTPATIENT
Start: 2020-07-17 | End: 2020-07-17

## 2020-07-17 RX ORDER — MAGNESIUM SULFATE 500 MG/ML
1 VIAL (ML) INJECTION ONCE
Refills: 0 | Status: COMPLETED | OUTPATIENT
Start: 2020-07-17 | End: 2020-07-17

## 2020-07-17 RX ADMIN — HYDROMORPHONE HYDROCHLORIDE 0.5 MILLIGRAM(S): 2 INJECTION INTRAMUSCULAR; INTRAVENOUS; SUBCUTANEOUS at 18:36

## 2020-07-17 RX ADMIN — Medication 15 MILLIGRAM(S): at 16:13

## 2020-07-17 RX ADMIN — HYDROMORPHONE HYDROCHLORIDE 0.5 MILLIGRAM(S): 2 INJECTION INTRAMUSCULAR; INTRAVENOUS; SUBCUTANEOUS at 18:08

## 2020-07-17 RX ADMIN — Medication 5 MILLIGRAM(S): at 21:33

## 2020-07-17 RX ADMIN — PANTOPRAZOLE SODIUM 40 MILLIGRAM(S): 20 TABLET, DELAYED RELEASE ORAL at 11:11

## 2020-07-17 RX ADMIN — HYDROMORPHONE HYDROCHLORIDE 0.5 MILLIGRAM(S): 2 INJECTION INTRAMUSCULAR; INTRAVENOUS; SUBCUTANEOUS at 22:17

## 2020-07-17 RX ADMIN — Medication 1000 MILLIGRAM(S): at 11:12

## 2020-07-17 RX ADMIN — Medication 1000 MILLIGRAM(S): at 16:41

## 2020-07-17 RX ADMIN — Medication 100 MILLIGRAM(S): at 21:33

## 2020-07-17 RX ADMIN — HYDROMORPHONE HYDROCHLORIDE 0.5 MILLIGRAM(S): 2 INJECTION INTRAMUSCULAR; INTRAVENOUS; SUBCUTANEOUS at 09:21

## 2020-07-17 RX ADMIN — Medication 1000 MILLIGRAM(S): at 11:50

## 2020-07-17 RX ADMIN — Medication 1000 MILLIGRAM(S): at 23:04

## 2020-07-17 RX ADMIN — Medication 1000 MILLIGRAM(S): at 17:31

## 2020-07-17 RX ADMIN — Medication 50 MILLIGRAM(S): at 18:08

## 2020-07-17 RX ADMIN — Medication 1000 MILLIGRAM(S): at 00:09

## 2020-07-17 RX ADMIN — Medication 1000 MILLIGRAM(S): at 23:59

## 2020-07-17 RX ADMIN — HYDROMORPHONE HYDROCHLORIDE 0.5 MILLIGRAM(S): 2 INJECTION INTRAMUSCULAR; INTRAVENOUS; SUBCUTANEOUS at 09:01

## 2020-07-17 RX ADMIN — Medication 100 MILLIGRAM(S): at 05:04

## 2020-07-17 RX ADMIN — Medication 100 MILLIGRAM(S): at 13:25

## 2020-07-17 RX ADMIN — Medication 1000 MILLIGRAM(S): at 06:47

## 2020-07-17 RX ADMIN — HYDROMORPHONE HYDROCHLORIDE 0.5 MILLIGRAM(S): 2 INJECTION INTRAMUSCULAR; INTRAVENOUS; SUBCUTANEOUS at 13:42

## 2020-07-17 RX ADMIN — Medication 1000 MILLIGRAM(S): at 05:04

## 2020-07-17 RX ADMIN — HYDROMORPHONE HYDROCHLORIDE 0.5 MILLIGRAM(S): 2 INJECTION INTRAMUSCULAR; INTRAVENOUS; SUBCUTANEOUS at 22:48

## 2020-07-17 RX ADMIN — HYDROMORPHONE HYDROCHLORIDE 0.5 MILLIGRAM(S): 2 INJECTION INTRAMUSCULAR; INTRAVENOUS; SUBCUTANEOUS at 04:53

## 2020-07-17 RX ADMIN — HYDROMORPHONE HYDROCHLORIDE 0.5 MILLIGRAM(S): 2 INJECTION INTRAMUSCULAR; INTRAVENOUS; SUBCUTANEOUS at 13:39

## 2020-07-17 RX ADMIN — HYDROMORPHONE HYDROCHLORIDE 0.5 MILLIGRAM(S): 2 INJECTION INTRAMUSCULAR; INTRAVENOUS; SUBCUTANEOUS at 05:20

## 2020-07-17 RX ADMIN — CEFTRIAXONE 100 MILLIGRAM(S): 500 INJECTION, POWDER, FOR SOLUTION INTRAMUSCULAR; INTRAVENOUS at 16:13

## 2020-07-17 RX ADMIN — Medication 15 MILLIGRAM(S): at 16:28

## 2020-07-17 RX ADMIN — ENOXAPARIN SODIUM 40 MILLIGRAM(S): 100 INJECTION SUBCUTANEOUS at 08:33

## 2020-07-17 RX ADMIN — Medication 100 GRAM(S): at 12:01

## 2020-07-17 RX ADMIN — Medication 15 MILLIGRAM(S): at 23:48

## 2020-07-17 RX ADMIN — Medication 15 MILLIGRAM(S): at 23:03

## 2020-07-17 NOTE — PROGRESS NOTE ADULT - SUBJECTIVE AND OBJECTIVE BOX
SUBJECTIVE: Patient seen and examined bedside. Reporting uncontrolled pain overnight, received 5mg of valium. Also reporting incontinent stools x1 following increased flatus. AVSS with adequate UOP. Tolerating CLD. Denies f/c, n/v, CP, SOB, weakness or pain in lower extremities.     cefTRIAXone   IVPB 1000 milliGRAM(s) IV Intermittent every 24 hours  enoxaparin Injectable 40 milliGRAM(s) SubCutaneous every 24 hours  metroNIDAZOLE  IVPB 500 milliGRAM(s) IV Intermittent every 8 hours      Vital Signs Last 24 Hrs  T(C): 37.4 (17 Jul 2020 17:31), Max: 38.3 (17 Jul 2020 16:24)  T(F): 99.4 (17 Jul 2020 17:31), Max: 100.9 (17 Jul 2020 16:24)  HR: 92 (17 Jul 2020 16:24) (66 - 92)  BP: 105/71 (17 Jul 2020 16:24) (94/56 - 113/76)  BP(mean): 69 (17 Jul 2020 00:28) (69 - 69)  RR: 17 (17 Jul 2020 16:24) (17 - 19)  SpO2: 95% (17 Jul 2020 16:24) (94% - 99%)  I&O's Detail    16 Jul 2020 07:01  -  17 Jul 2020 07:00  --------------------------------------------------------  IN:    IV PiggyBack: 300 mL    lactated ringers.: 840 mL    Oral Fluid: 240 mL  Total IN: 1380 mL    OUT:    Indwelling Catheter - Urethral: 750 mL  Total OUT: 750 mL    Total NET: 630 mL      17 Jul 2020 07:01  -  17 Jul 2020 18:15  --------------------------------------------------------  IN:  Total IN: 0 mL    OUT:    Indwelling Catheter - Urethral: 540 mL  Total OUT: 540 mL    Total NET: -540 mL          General: NAD, resting comfortably in bed  C/V: NSR  Pulm: Nonlabored breathing, no respiratory distress  Abd: soft, mildly distended. No rebound or guarding. Appropriately tender to palpation.  Extrem: WWP, no edema, SCDs in place  Incisions: c/d/i        LABS:                        8.8    8.79  )-----------( 259      ( 17 Jul 2020 06:09 )             28.8     07-17    138  |  106  |  11  ----------------------------<  88  4.2   |  23  |  0.76    Ca    8.1<L>      17 Jul 2020 06:09  Phos  2.9     07-17  Mg     1.8     07-17            RADIOLOGY & ADDITIONAL STUDIES:

## 2020-07-17 NOTE — PROVIDER CONTACT NOTE (OTHER) - RECOMMENDATIONS
Increase fluid rate as I have encouraged pt to drink more water multiple times already with no compliance.

## 2020-07-17 NOTE — CHART NOTE - NSCHARTNOTEFT_GEN_A_CORE
Admitting Diagnosis:   Patient is a 34y old  Female who presents with a chief complaint of elective surgery (16 Jul 2020 17:07)    Consult: Yes [   ]  No [ x  ]    Reason for Initial Nutrition Assessment: LOS Nutrition Assessment    PAST MEDICAL & SURGICAL HISTORY:  Crohn disease    Current Nutrition Order: Clear Liquid Diet    PO Intake: Good (%) [   ]  Fair (50-75%) [   ] Poor (<25%) [   ]- Pt consuming 25-50% of meals. Appetite is greater once pain is controlled.     GI Issues:   WDL, last BM 6/15  No n/v/d/c noted  No abd distention noted    Pain:  8/10 abd pain- pain reduced and well controlled with current pain regime    Skin Integrity:  Surgical incision, carmella score 20    Labs:   07-17    138  |  106  |  11  ----------------------------<  88  4.2   |  23  |  0.76    Ca    8.1<L>      17 Jul 2020 06:09  Phos  2.9     07-17  Mg     1.8     07-17    Medications:  MEDICATIONS  (STANDING):  acetaminophen   Tablet .. 1000 milliGRAM(s) Oral every 6 hours  BUpivacaine liposome 1.3% Injectable (no eMAR) 20 milliLiter(s) Local Injection once  cefTRIAXone   IVPB 1000 milliGRAM(s) IV Intermittent every 24 hours  enoxaparin Injectable 40 milliGRAM(s) SubCutaneous every 24 hours  hydrocortisone sodium succinate Injectable 50 milliGRAM(s) IV Push once  ketorolac   Injectable 15 milliGRAM(s) IV Push every 6 hours  lactated ringers. 1000 milliLiter(s) (40 mL/Hr) IV Continuous <Continuous>  melatonin 5 milliGRAM(s) Oral at bedtime  metroNIDAZOLE  IVPB 500 milliGRAM(s) IV Intermittent every 8 hours  pantoprazole  Injectable 40 milliGRAM(s) IV Push once    MEDICATIONS  (PRN):  HYDROmorphone  Injectable 0.5 milliGRAM(s) IV Push every 4 hours PRN Severe Pain (7 - 10)  ondansetron Injectable 4 milliGRAM(s) IV Push every 6 hours PRN Nausea and/or Vomiting    Admitted Anthropometrics:  Height: 68" Weight: 202lbs, IBW 140lbs+/-10%, %%, BMI 30.8 kg/m2     Weight:  7/14 202lbs    Weight Change:     Nutrition Focused Physical Exam: Completed [   ]  Unable to complete [   ]  Muscle Wasting:  Subcutaneous Fat Wasting:    Estimated energy needs:     Subjective:     Nutrition Diagnosis:    [  ] No active nutrition diagnosis at this time  [  ] Current medical condition precludes nutrition intervention    Goal:    Recommendations:    Education:     Risk Level: High [   ] Moderate [   ] Low [   ] Admitting Diagnosis:   Patient is a 34y old  Female who presents with a chief complaint of elective surgery (16 Jul 2020 17:07)    Consult: Yes [   ]  No [ x  ]    Reason for Initial Nutrition Assessment: LOS Nutrition Assessment    PAST MEDICAL & SURGICAL HISTORY:  Crohn disease    Current Nutrition Order: Clear Liquid Diet    PO Intake: Good (%) [   ]  Fair (50-75%) [   ] Poor (<25%) [   ]- Pt consuming 25-50% of meals. Appetite is greater once pain is controlled.     GI Issues:   WDL, last BM 6/15  No n/v/d/c noted  No abd distention noted    Pain:  8/10 abd pain- pain reduced and well controlled with current pain regime    Skin Integrity:  Surgical incision, carmella score 20  No edema present  No pressure ulcers noted    Labs:   07-17    138  |  106  |  11  ----------------------------<  88  4.2   |  23  |  0.76    Ca    8.1<L>      17 Jul 2020 06:09  Phos  2.9     07-17  Mg     1.8     07-17    Medications:  MEDICATIONS  (STANDING):  acetaminophen   Tablet .. 1000 milliGRAM(s) Oral every 6 hours  BUpivacaine liposome 1.3% Injectable (no eMAR) 20 milliLiter(s) Local Injection once  cefTRIAXone   IVPB 1000 milliGRAM(s) IV Intermittent every 24 hours  enoxaparin Injectable 40 milliGRAM(s) SubCutaneous every 24 hours  hydrocortisone sodium succinate Injectable 50 milliGRAM(s) IV Push once  ketorolac   Injectable 15 milliGRAM(s) IV Push every 6 hours  lactated ringers. 1000 milliLiter(s) (40 mL/Hr) IV Continuous <Continuous>  melatonin 5 milliGRAM(s) Oral at bedtime  metroNIDAZOLE  IVPB 500 milliGRAM(s) IV Intermittent every 8 hours  pantoprazole  Injectable 40 milliGRAM(s) IV Push once    MEDICATIONS  (PRN):  HYDROmorphone  Injectable 0.5 milliGRAM(s) IV Push every 4 hours PRN Severe Pain (7 - 10)  ondansetron Injectable 4 milliGRAM(s) IV Push every 6 hours PRN Nausea and/or Vomiting    Admitted Anthropometrics:  Height: 68" Weight: 202lbs, IBW 140lbs+/-10%, %%, BMI 30.8 kg/m2     Weight:  7/14 202lbs    Weight Change: UBW consistent with admission weight. Please cont to trend weights biweekly.     Nutrition Focused Physical Exam: Completed [   ]  Unable to complete [   ]- not pertinent    Estimated energy needs:   IBW (64kg) used for calculations as pt >120% of IBW. Please use upper end of est needs to support wound healing.   Kcal (25-30 kcal/kg): 2764-0561 kcal  Protein (1.0-1.2 g/kg pro): 64-77 g pro  Fluids (25-30 ml/kg): 3578-3079 ml    Subjective: 34F with PMH of Crohn's disease and possible bladder abscess presents today for elective ileo colic resection now s/p laparoscopic robot assisted right hemicolectomy on 7/14. Pain and nausea control prn. On assessment, pt is resting in bed comfortably. Currently pt is on Clear liquid diet tolerating PO well. Pt consuming 25-50% but noted when pain is controlled, then appetite increases. Pt noted some nausea 7/16 but none this am. Cont to encourage adequate PO intake when feasible. Education provided on likely diet progression, and low fiber diet guidelines- pt appears receptive. Stated she was following a lower fiber diet PTA 2/2 Crohn's. Noted appetite was good PTA. UBW consistent with admission weight. NKFA. Please see nutrition recs below. RD to follow up per protocol.     Nutrition Diagnosis: Inadequate energy intake RT inability to meet est needs on CLD AEB meeting <50% est needs on current diet.     [  ] No active nutrition diagnosis at this time  [  ] Current medical condition precludes nutrition intervention    Goal: 1. Diet will advance within 24-48hrs 2. Consistently meet >75% est needs    Recommendations:  1. CLD  >> Recommend advance to LFD when feasible  2. Recommend addition of MVI  3. Adequate pain and bowel control per team  4. RD diet education reenforcement prn    Education: Education provided on likely diet progression, and low fiber diet guidelines- pt appears receptive    Risk Level: High [ x  ] Moderate [   ] Low [   ]

## 2020-07-17 NOTE — PROGRESS NOTE ADULT - ASSESSMENT
34 year old female with PMH of Crohn's disease and possible bladder abscess presents today for elective ileo colic resection now s/p laparoscopic robot assisted right hemicolectomy on 7/14    Advanced to low residue diet as tolerated  IVF: LR 40cc/hr  AM labs, lytes repleted  Pain/nausea control prn  Day 3:3 of stress dose steroid- hydrocortisone 50mg q 8 x 24hrs, then 50mg q12  Per urology-laguerre to stay for ten days postoperatively  SQH/SCDs/OOBA/IS

## 2020-07-18 LAB
ANION GAP SERPL CALC-SCNC: 9 MMOL/L — SIGNIFICANT CHANGE UP (ref 5–17)
BUN SERPL-MCNC: 8 MG/DL — SIGNIFICANT CHANGE UP (ref 7–23)
CALCIUM SERPL-MCNC: 8 MG/DL — LOW (ref 8.4–10.5)
CHLORIDE SERPL-SCNC: 105 MMOL/L — SIGNIFICANT CHANGE UP (ref 96–108)
CO2 SERPL-SCNC: 23 MMOL/L — SIGNIFICANT CHANGE UP (ref 22–31)
CREAT SERPL-MCNC: 0.68 MG/DL — SIGNIFICANT CHANGE UP (ref 0.5–1.3)
GLUCOSE SERPL-MCNC: 76 MG/DL — SIGNIFICANT CHANGE UP (ref 70–99)
HCT VFR BLD CALC: 28.6 % — LOW (ref 34.5–45)
HGB BLD-MCNC: 8.8 G/DL — LOW (ref 11.5–15.5)
MAGNESIUM SERPL-MCNC: 2 MG/DL — SIGNIFICANT CHANGE UP (ref 1.6–2.6)
MCHC RBC-ENTMCNC: 27.2 PG — SIGNIFICANT CHANGE UP (ref 27–34)
MCHC RBC-ENTMCNC: 30.8 GM/DL — LOW (ref 32–36)
MCV RBC AUTO: 88.3 FL — SIGNIFICANT CHANGE UP (ref 80–100)
NRBC # BLD: 0 /100 WBCS — SIGNIFICANT CHANGE UP (ref 0–0)
PHOSPHATE SERPL-MCNC: 3 MG/DL — SIGNIFICANT CHANGE UP (ref 2.5–4.5)
PLATELET # BLD AUTO: 298 K/UL — SIGNIFICANT CHANGE UP (ref 150–400)
POTASSIUM SERPL-MCNC: 3.8 MMOL/L — SIGNIFICANT CHANGE UP (ref 3.5–5.3)
POTASSIUM SERPL-SCNC: 3.8 MMOL/L — SIGNIFICANT CHANGE UP (ref 3.5–5.3)
RBC # BLD: 3.24 M/UL — LOW (ref 3.8–5.2)
RBC # FLD: 14 % — SIGNIFICANT CHANGE UP (ref 10.3–14.5)
SODIUM SERPL-SCNC: 137 MMOL/L — SIGNIFICANT CHANGE UP (ref 135–145)
WBC # BLD: 8.67 K/UL — SIGNIFICANT CHANGE UP (ref 3.8–10.5)
WBC # FLD AUTO: 8.67 K/UL — SIGNIFICANT CHANGE UP (ref 3.8–10.5)

## 2020-07-18 RX ORDER — DIAZEPAM 5 MG
5 TABLET ORAL ONCE
Refills: 0 | Status: DISCONTINUED | OUTPATIENT
Start: 2020-07-18 | End: 2020-07-18

## 2020-07-18 RX ADMIN — Medication 1000 MILLIGRAM(S): at 06:40

## 2020-07-18 RX ADMIN — HYDROMORPHONE HYDROCHLORIDE 0.5 MILLIGRAM(S): 2 INJECTION INTRAMUSCULAR; INTRAVENOUS; SUBCUTANEOUS at 19:46

## 2020-07-18 RX ADMIN — ENOXAPARIN SODIUM 40 MILLIGRAM(S): 100 INJECTION SUBCUTANEOUS at 08:28

## 2020-07-18 RX ADMIN — Medication 15 MILLIGRAM(S): at 21:44

## 2020-07-18 RX ADMIN — HYDROMORPHONE HYDROCHLORIDE 0.5 MILLIGRAM(S): 2 INJECTION INTRAMUSCULAR; INTRAVENOUS; SUBCUTANEOUS at 15:38

## 2020-07-18 RX ADMIN — HYDROMORPHONE HYDROCHLORIDE 0.5 MILLIGRAM(S): 2 INJECTION INTRAMUSCULAR; INTRAVENOUS; SUBCUTANEOUS at 15:56

## 2020-07-18 RX ADMIN — HYDROMORPHONE HYDROCHLORIDE 0.5 MILLIGRAM(S): 2 INJECTION INTRAMUSCULAR; INTRAVENOUS; SUBCUTANEOUS at 11:06

## 2020-07-18 RX ADMIN — HYDROMORPHONE HYDROCHLORIDE 0.5 MILLIGRAM(S): 2 INJECTION INTRAMUSCULAR; INTRAVENOUS; SUBCUTANEOUS at 20:00

## 2020-07-18 RX ADMIN — Medication 1000 MILLIGRAM(S): at 18:16

## 2020-07-18 RX ADMIN — HYDROMORPHONE HYDROCHLORIDE 0.5 MILLIGRAM(S): 2 INJECTION INTRAMUSCULAR; INTRAVENOUS; SUBCUTANEOUS at 05:30

## 2020-07-18 RX ADMIN — Medication 1000 MILLIGRAM(S): at 13:40

## 2020-07-18 RX ADMIN — ONDANSETRON 4 MILLIGRAM(S): 8 TABLET, FILM COATED ORAL at 23:08

## 2020-07-18 RX ADMIN — Medication 1000 MILLIGRAM(S): at 23:08

## 2020-07-18 RX ADMIN — Medication 1000 MILLIGRAM(S): at 17:32

## 2020-07-18 RX ADMIN — Medication 15 MILLIGRAM(S): at 21:29

## 2020-07-18 RX ADMIN — Medication 1000 MILLIGRAM(S): at 13:02

## 2020-07-18 RX ADMIN — HYDROMORPHONE HYDROCHLORIDE 0.5 MILLIGRAM(S): 2 INJECTION INTRAMUSCULAR; INTRAVENOUS; SUBCUTANEOUS at 05:58

## 2020-07-18 RX ADMIN — Medication 100 MILLIGRAM(S): at 05:32

## 2020-07-18 RX ADMIN — Medication 1000 MILLIGRAM(S): at 05:30

## 2020-07-18 RX ADMIN — Medication 15 MILLIGRAM(S): at 13:02

## 2020-07-18 RX ADMIN — Medication 15 MILLIGRAM(S): at 13:40

## 2020-07-18 RX ADMIN — HYDROMORPHONE HYDROCHLORIDE 0.5 MILLIGRAM(S): 2 INJECTION INTRAMUSCULAR; INTRAVENOUS; SUBCUTANEOUS at 11:18

## 2020-07-18 NOTE — PROVIDER CONTACT NOTE (OTHER) - SITUATION
Pt urine output to laguerre was 220 from 5:30 am-1:30. Pt is noncompliant re: drinking fluids. Drinks only small amounts after continuous encouragement.

## 2020-07-18 NOTE — PROGRESS NOTE ADULT - SUBJECTIVE AND OBJECTIVE BOX
SUBJECTIVE: Patient seen and examined bedside. Reports abdominal pain controlled with medication. Denies particularly nausea, vomit, chest pain, SOB, calves tenderness.    enoxaparin Injectable 40 milliGRAM(s) SubCutaneous every 24 hours      Vital Signs Last 24 Hrs  T(C): 36.9 (18 Jul 2020 21:28), Max: 36.9 (18 Jul 2020 13:54)  T(F): 98.5 (18 Jul 2020 21:28), Max: 98.5 (18 Jul 2020 13:54)  HR: 84 (18 Jul 2020 21:28) (73 - 92)  BP: 100/63 (18 Jul 2020 21:28) (100/63 - 136/84)  BP(mean): --  RR: 16 (18 Jul 2020 21:28) (16 - 18)  SpO2: 97% (18 Jul 2020 21:28) (96% - 97%)  I&O's Detail    17 Jul 2020 07:01  -  18 Jul 2020 07:00  --------------------------------------------------------  IN:    IV PiggyBack: 200 mL    lactated ringers.: 320 mL    Oral Fluid: 120 mL  Total IN: 640 mL    OUT:    Indwelling Catheter - Urethral: 1290 mL  Total OUT: 1290 mL    Total NET: -650 mL      18 Jul 2020 07:01  -  19 Jul 2020 01:34  --------------------------------------------------------  IN:    Oral Fluid: 120 mL  Total IN: 120 mL    OUT:    Indwelling Catheter - Urethral: 555 mL  Total OUT: 555 mL    Total NET: -435 mL          General: NAD, resting comfortably in bed  C/V: NSR  Pulm: Nonlabored breathing, no respiratory distress  Abd: soft, NT/ND.  Extrem: WWP, no edema, SCDs in place        LABS:                        8.8    8.67  )-----------( 298      ( 18 Jul 2020 08:41 )             28.6     07-18    137  |  105  |  8   ----------------------------<  76  3.8   |  23  |  0.68    Ca    8.0<L>      18 Jul 2020 08:41  Phos  3.0     07-18  Mg     2.0     07-18            RADIOLOGY & ADDITIONAL STUDIES:

## 2020-07-19 ENCOUNTER — TRANSCRIPTION ENCOUNTER (OUTPATIENT)
Age: 35
End: 2020-07-19

## 2020-07-19 VITALS
HEART RATE: 97 BPM | TEMPERATURE: 99 F | SYSTOLIC BLOOD PRESSURE: 100 MMHG | OXYGEN SATURATION: 98 % | RESPIRATION RATE: 18 BRPM | DIASTOLIC BLOOD PRESSURE: 67 MMHG

## 2020-07-19 LAB
ANION GAP SERPL CALC-SCNC: 14 MMOL/L — SIGNIFICANT CHANGE UP (ref 5–17)
BUN SERPL-MCNC: 7 MG/DL — SIGNIFICANT CHANGE UP (ref 7–23)
CALCIUM SERPL-MCNC: 8.1 MG/DL — LOW (ref 8.4–10.5)
CHLORIDE SERPL-SCNC: 104 MMOL/L — SIGNIFICANT CHANGE UP (ref 96–108)
CO2 SERPL-SCNC: 19 MMOL/L — LOW (ref 22–31)
CREAT SERPL-MCNC: 0.6 MG/DL — SIGNIFICANT CHANGE UP (ref 0.5–1.3)
GLUCOSE SERPL-MCNC: 65 MG/DL — LOW (ref 70–99)
HCG SERPL-ACNC: <0 MIU/ML — SIGNIFICANT CHANGE UP
HCT VFR BLD CALC: 30.4 % — LOW (ref 34.5–45)
HGB BLD-MCNC: 9.3 G/DL — LOW (ref 11.5–15.5)
MAGNESIUM SERPL-MCNC: 1.7 MG/DL — SIGNIFICANT CHANGE UP (ref 1.6–2.6)
MCHC RBC-ENTMCNC: 26.9 PG — LOW (ref 27–34)
MCHC RBC-ENTMCNC: 30.6 GM/DL — LOW (ref 32–36)
MCV RBC AUTO: 87.9 FL — SIGNIFICANT CHANGE UP (ref 80–100)
NRBC # BLD: 0 /100 WBCS — SIGNIFICANT CHANGE UP (ref 0–0)
PHOSPHATE SERPL-MCNC: 3.4 MG/DL — SIGNIFICANT CHANGE UP (ref 2.5–4.5)
PLATELET # BLD AUTO: 330 K/UL — SIGNIFICANT CHANGE UP (ref 150–400)
POTASSIUM SERPL-MCNC: 3.6 MMOL/L — SIGNIFICANT CHANGE UP (ref 3.5–5.3)
POTASSIUM SERPL-SCNC: 3.6 MMOL/L — SIGNIFICANT CHANGE UP (ref 3.5–5.3)
RBC # BLD: 3.46 M/UL — LOW (ref 3.8–5.2)
RBC # FLD: 14 % — SIGNIFICANT CHANGE UP (ref 10.3–14.5)
SODIUM SERPL-SCNC: 137 MMOL/L — SIGNIFICANT CHANGE UP (ref 135–145)
WBC # BLD: 9.54 K/UL — SIGNIFICANT CHANGE UP (ref 3.8–10.5)
WBC # FLD AUTO: 9.54 K/UL — SIGNIFICANT CHANGE UP (ref 3.8–10.5)

## 2020-07-19 PROCEDURE — 80048 BASIC METABOLIC PNL TOTAL CA: CPT

## 2020-07-19 PROCEDURE — 85027 COMPLETE CBC AUTOMATED: CPT

## 2020-07-19 PROCEDURE — 36415 COLL VENOUS BLD VENIPUNCTURE: CPT

## 2020-07-19 PROCEDURE — 84100 ASSAY OF PHOSPHORUS: CPT

## 2020-07-19 PROCEDURE — S2900: CPT

## 2020-07-19 PROCEDURE — 82962 GLUCOSE BLOOD TEST: CPT

## 2020-07-19 PROCEDURE — 84702 CHORIONIC GONADOTROPIN TEST: CPT

## 2020-07-19 PROCEDURE — 85025 COMPLETE CBC W/AUTO DIFF WBC: CPT

## 2020-07-19 PROCEDURE — C1889: CPT

## 2020-07-19 PROCEDURE — 83735 ASSAY OF MAGNESIUM: CPT

## 2020-07-19 PROCEDURE — 88307 TISSUE EXAM BY PATHOLOGIST: CPT

## 2020-07-19 RX ORDER — KETOROLAC TROMETHAMINE 30 MG/ML
15 SYRINGE (ML) INJECTION ONCE
Refills: 0 | Status: DISCONTINUED | OUTPATIENT
Start: 2020-07-19 | End: 2020-07-19

## 2020-07-19 RX ORDER — PANTOPRAZOLE SODIUM 20 MG/1
1 TABLET, DELAYED RELEASE ORAL
Qty: 30 | Refills: 0
Start: 2020-07-19 | End: 2020-08-17

## 2020-07-19 RX ORDER — OXYCODONE HYDROCHLORIDE 5 MG/1
1 TABLET ORAL
Qty: 40 | Refills: 0
Start: 2020-07-19 | End: 2020-07-28

## 2020-07-19 RX ORDER — ACETAMINOPHEN 500 MG
2 TABLET ORAL
Qty: 0 | Refills: 0 | DISCHARGE
Start: 2020-07-19

## 2020-07-19 RX ORDER — MAGNESIUM SULFATE 500 MG/ML
1 VIAL (ML) INJECTION ONCE
Refills: 0 | Status: COMPLETED | OUTPATIENT
Start: 2020-07-19 | End: 2020-07-19

## 2020-07-19 RX ORDER — BUDESONIDE, MICRONIZED 100 %
10 POWDER (GRAM) MISCELLANEOUS
Qty: 0 | Refills: 0 | DISCHARGE

## 2020-07-19 RX ORDER — DOCUSATE SODIUM 100 MG
1 CAPSULE ORAL
Qty: 60 | Refills: 0
Start: 2020-07-19 | End: 2020-08-17

## 2020-07-19 RX ORDER — POTASSIUM CHLORIDE 20 MEQ
20 PACKET (EA) ORAL ONCE
Refills: 0 | Status: COMPLETED | OUTPATIENT
Start: 2020-07-19 | End: 2020-07-19

## 2020-07-19 RX ORDER — LANOLIN ALCOHOL/MO/W.PET/CERES
1 CREAM (GRAM) TOPICAL
Qty: 0 | Refills: 0 | DISCHARGE
Start: 2020-07-19

## 2020-07-19 RX ORDER — SODIUM CHLORIDE 9 MG/ML
500 INJECTION, SOLUTION INTRAVENOUS ONCE
Refills: 0 | Status: COMPLETED | OUTPATIENT
Start: 2020-07-19 | End: 2020-07-19

## 2020-07-19 RX ADMIN — Medication 1000 MILLIGRAM(S): at 05:35

## 2020-07-19 RX ADMIN — HYDROMORPHONE HYDROCHLORIDE 0.5 MILLIGRAM(S): 2 INJECTION INTRAMUSCULAR; INTRAVENOUS; SUBCUTANEOUS at 06:57

## 2020-07-19 RX ADMIN — Medication 1000 MILLIGRAM(S): at 05:13

## 2020-07-19 RX ADMIN — Medication 1000 MILLIGRAM(S): at 00:11

## 2020-07-19 RX ADMIN — HYDROMORPHONE HYDROCHLORIDE 0.5 MILLIGRAM(S): 2 INJECTION INTRAMUSCULAR; INTRAVENOUS; SUBCUTANEOUS at 07:10

## 2020-07-19 RX ADMIN — Medication 20 MILLIEQUIVALENT(S): at 09:34

## 2020-07-19 RX ADMIN — Medication 15 MILLIGRAM(S): at 13:45

## 2020-07-19 RX ADMIN — Medication 5 MILLIGRAM(S): at 00:11

## 2020-07-19 RX ADMIN — Medication 15 MILLIGRAM(S): at 05:14

## 2020-07-19 RX ADMIN — HYDROMORPHONE HYDROCHLORIDE 0.5 MILLIGRAM(S): 2 INJECTION INTRAMUSCULAR; INTRAVENOUS; SUBCUTANEOUS at 01:01

## 2020-07-19 RX ADMIN — Medication 15 MILLIGRAM(S): at 14:00

## 2020-07-19 RX ADMIN — Medication 100 GRAM(S): at 09:34

## 2020-07-19 RX ADMIN — SODIUM CHLORIDE 1000 MILLILITER(S): 9 INJECTION, SOLUTION INTRAVENOUS at 06:43

## 2020-07-19 RX ADMIN — Medication 1000 MILLIGRAM(S): at 11:00

## 2020-07-19 RX ADMIN — Medication 15 MILLIGRAM(S): at 05:35

## 2020-07-19 RX ADMIN — HYDROMORPHONE HYDROCHLORIDE 0.5 MILLIGRAM(S): 2 INJECTION INTRAMUSCULAR; INTRAVENOUS; SUBCUTANEOUS at 01:15

## 2020-07-19 RX ADMIN — Medication 1000 MILLIGRAM(S): at 12:00

## 2020-07-19 NOTE — DISCHARGE NOTE PROVIDER - NSDCCPCAREPLAN_GEN_ALL_CORE_FT
PRINCIPAL DISCHARGE DIAGNOSIS  Diagnosis: Crohn's disease of small intestine with abscess  Assessment and Plan of Treatment:

## 2020-07-19 NOTE — PROGRESS NOTE ADULT - SUBJECTIVE AND OBJECTIVE BOX
SUBJECTIVE: Patient seen and examined bedside. No acute events overnight. AVSS with marginal UOP. Reporting persistent abdominal pain, though currently better controlled with current pain regimen. Tolerating low residue diet though reporting low appetite. Ambulating around the unit. Requesting clarification with shade as to whether she will be following up with urology or her current primary care physician. Reporting small loose bowel movements and some flatus. Denies f/c, n/v, CP, SOB, weakness or pain in lower extremities. 	    enoxaparin Injectable 40 milliGRAM(s) SubCutaneous every 24 hours      Vital Signs Last 24 Hrs  T(C): 36.9 (19 Jul 2020 08:39), Max: 37 (19 Jul 2020 05:06)  T(F): 98.5 (19 Jul 2020 08:39), Max: 98.6 (19 Jul 2020 05:06)  HR: 90 (19 Jul 2020 08:39) (84 - 90)  BP: 108/73 (19 Jul 2020 08:39) (100/63 - 120/77)  BP(mean): --  RR: 17 (19 Jul 2020 08:39) (16 - 17)  SpO2: 95% (19 Jul 2020 08:39) (95% - 97%)  I&O's Detail    18 Jul 2020 07:01  -  19 Jul 2020 07:00  --------------------------------------------------------  IN:    Lactated Ringers IV Bolus: 500 mL    Oral Fluid: 120 mL  Total IN: 620 mL    OUT:    Indwelling Catheter - Urethral: 780 mL  Total OUT: 780 mL    Total NET: -160 mL      19 Jul 2020 07:01  -  19 Jul 2020 12:22  --------------------------------------------------------  IN:  Total IN: 0 mL    OUT:    Indwelling Catheter - Urethral: 275 mL  Total OUT: 275 mL    Total NET: -275 mL          General: NAD, resting comfortably in bed  C/V: NSR  Pulm: Nonlabored breathing, no respiratory distress  Abd: soft, appropriately TTP. No rebound or guarding.   Extrem: WWP, no edema, SCDs in place  Incisions: c/d/i        LABS:                        9.3    9.54  )-----------( 330      ( 19 Jul 2020 07:04 )             30.4     07-19    137  |  104  |  7   ----------------------------<  65<L>  3.6   |  19<L>  |  0.60    Ca    8.1<L>      19 Jul 2020 07:04  Phos  3.4     07-19  Mg     1.7     07-19            RADIOLOGY & ADDITIONAL STUDIES:

## 2020-07-19 NOTE — PROGRESS NOTE ADULT - ASSESSMENT
34 year old female with PMH of Crohn's disease and ileal-vesicular abscess presents today for elective ileo colic resection now s/p laparoscopic robot assisted right hemicolectomy on 7/14    Advanced to low residue diet as tolerated  IVF: LR 40cc/hr  AM labs, lytes repleted  Pain/nausea control prn  Day 3:3 of stress dose steroid- hydrocortisone 50mg q 8 x 24hrs, then 50mg q12  Per urology-laguerre to stay for ten days postoperatively  SQH/SCDs/OOBA/IS 34 year old female with PMH of Crohn's disease and ileal-vesicular abscess presents today for elective ileo colic resection now s/p laparoscopic robot assisted right hemicolectomy, intraabdominal abscess washout, and cystoscopy 7/14    LRD as tolerated  HLIV  AM labs, lytes repleted  Pain/nausea control prn  Stress dose dose of steroids for 3 days completed  Per urology-laguerre to stay for ten days postoperatively  SQH/SCDs/OOBA/IS  Dispo: Home discharge today

## 2020-07-19 NOTE — DISCHARGE NOTE NURSING/CASE MANAGEMENT/SOCIAL WORK - PATIENT PORTAL LINK FT
You can access the FollowMyHealth Patient Portal offered by [facility] by registering at the following website: [email or web address]. By joining [employer]’s FollowMyHealth portal, you will also be able to view your health information using other applications (apps) compatible with our system.

## 2020-07-19 NOTE — DISCHARGE NOTE PROVIDER - NSDCACTIVITY_GEN_ALL_CORE
Driving allowed/Walking - Indoors allowed/No heavy lifting/straining/Showering allowed/Walking - Outdoors allowed/Stairs allowed

## 2020-07-19 NOTE — DISCHARGE NOTE PROVIDER - HOSPITAL COURSE
Beverly Quick is a 34 year old female with PMH of Crohn's disease (on Budesonide) and entero-vesicular abscess who presented to [facility] on 7/14/20 for elective robotic-assisted right hemicolectomy with ileocecectomy and perienteric abscess evacuation. Patient was initially diagnosed with Crohn's disease on 6/2019 by colonoscopy after several month history of abdominal pain. Last CT scan on 11/15/19 showed long segment transmural circumferential disease of the terminal ileum and previous described abscess located just anterior to the diseased terminal ileum, intimately related and partially incorporated into the anterosuperior wall of the urinary bladder. Risks, alternatives to treatment, and benefits were discussed with the patient and appropriate consent was obtained. Intraoperative course was notable for perienteric abscess washout and intraop consult to urology for cystoscopy. She tolerated the procedure well and was transferred to the PACU in stable condition. GI was consulted and recommended stress dose of steroids for three days and then resumption of home steroids. Urology recommended continuing the laguerre catheter for total of 10 days postoperatively. Hospital course also notable for four days of rocephin/flagyl for intraabdominal abscess. She was discharged home with laguerre leg bag on POD5 tolerating low residue diet, having bowel movements and passing flatus, with pain controlled with oral analgesics, vitals all wnl, oxycodone and colace with instructions to follow up with Ruth Devine, and her primary care physician.

## 2020-07-19 NOTE — DISCHARGE NOTE PROVIDER - CARE PROVIDERS DIRECT ADDRESSES
[email or web address],DirectAddress_Unknown

## 2020-07-19 NOTE — DISCHARGE NOTE PROVIDER - CARE PROVIDER_API CALL
Jason Jimenes  COLON/RECTAL SURGERY  [address], [address] [place] [address]  Phone: [phone]  Fax: [phone]  Established Patient  Follow Up Time: 2 weeks    Sahil dennison  [address] [place] [address] [address], [place] [address]  Phone: [phone]  Fax: (   )    -  Established Patient  Scheduled Appointment: 08/10/2020    Dimitris Orellana  UROLOGY  [address] [place] [address]  Phone: [phone]  Fax: [phone]  Follow Up Time:

## 2020-07-19 NOTE — DISCHARGE NOTE PROVIDER - INSTRUCTIONS
Continue low fiber diet Continue low fiber diet (information given in surgical instruction packet)  - Vegetables should initially be cooked (backed, steamed, blanched, etc.)  - May want to start with peeled and/or canned fruit  - Limit fat/oil intake and fried/greasy foods  - Add small amounts of fiber back in to the diet every couple days

## 2020-07-19 NOTE — PROGRESS NOTE ADULT - REASON FOR ADMISSION
elective surgery
right hemicolectomy

## 2020-07-19 NOTE — DISCHARGE NOTE PROVIDER - NSDCFUADDINST_GEN_ALL_CORE_FT
Follow up with  ___ in 1-2 weeks. Call the office at the number below to schedule your appointment. You may shower; soap and water over incision sites. Do not scrub. Pat dry when done. No tub bathing or swimming until cleared. Keep incision sites out of the sun as scars will darken. Ambulate as tolerated, but no heavy lifting (>10lbs) or strenuous exercise. You may resume regular diet. You should be urinating at least 3-4x per day. Call the office if you experience increasing abdominal pain, nausea, vomiting, or temperature >101 F.    -Please take Colace (Docusate) 100 mg twice a day. Take Percocet if needed for pain.  -Avoiding straining with BM’s and maintaining adequate hydration and fiber intake  -Drink 6-10 glasses of water daily and eat a mix of fruits, vegetables and whole grains  -Can take fiber supplement in addition to dietary fiber but only provides 1/3 (10mg) of needed daily intake    Please continue a LOW-FIBER DIET. Listed below are some foods you may eat and those you should avoid.   --Allowed foods:  White bread without nuts and seeds  White rice, plain white pasta, and crackers  Refined hot cereals, such as Cream of Wheat, or cold cereals with less than 1 gram of fiber per serving  Pancakes or waffles made from white refined flour  Most canned or well-cooked vegetables and fruits without skins or seeds  Fruit and vegetable juice with little or no pulp, fruit-flavored drinks, and flavored coats  Tender meat, poultry, fish, eggs and tofu  Milk and foods made from milk — such as yogurt, pudding, ice cream, cheeses and sour cream — if tolerated  Butter, margarine, oils and salad dressings without seeds  --Foods to avoid:  Whole-wheat or whole-grain breads, cereals and pasta  Brown or wild rice and other whole grains, such as oats, kasha, barley and quinoa  Dried fruits and prune juice  Raw fruit, including those with seeds, skin or membranes, such as berries  Raw or undercooked vegetables, including corn  Dried beans, peas and lentils  Seeds and nuts and foods containing them, including peanut butter and other nut butters  Coconut  Popcorn Follow up with Dr. Jimenes in 1-2 weeks. Call the office at the number below to schedule your appointment. You may shower; soap and water over incision sites. Do not scrub. Pat dry when done. No tub bathing or swimming until cleared. Keep incision sites out of the sun as scars will darken. Ambulate as tolerated, but no heavy lifting (>10lbs) or strenuous exercise. You may resume regular diet. You should be urinating at least 3-4x per day. Call the office if you experience increasing abdominal pain, nausea, vomiting, or temperature >101 F.    -Please take Colace (Docusate) 100 mg twice a day. Take Percocet if needed for pain.  -Avoiding straining with BM’s and maintaining adequate hydration and fiber intake  -Drink 6-10 glasses of water daily and eat a mix of fruits, vegetables and whole grains  -Can take fiber supplement in addition to dietary fiber but only provides 1/3 (10mg) of needed daily intake    Please continue a LOW-FIBER DIET. Listed below are some foods you may eat and those you should avoid.   --Allowed foods:  White bread without nuts and seeds  White rice, plain white pasta, and crackers  Refined hot cereals, such as Cream of Wheat, or cold cereals with less than 1 gram of fiber per serving  Pancakes or waffles made from white refined flour  Most canned or well-cooked vegetables and fruits without skins or seeds  Fruit and vegetable juice with little or no pulp, fruit-flavored drinks, and flavored coats  Tender meat, poultry, fish, eggs and tofu  Milk and foods made from milk — such as yogurt, pudding, ice cream, cheeses and sour cream — if tolerated  Butter, margarine, oils and salad dressings without seeds  --Foods to avoid:  Whole-wheat or whole-grain breads, cereals and pasta  Brown or wild rice and other whole grains, such as oats, kasha, barley and quinoa  Dried fruits and prune juice  Raw fruit, including those with seeds, skin or membranes, such as berries  Raw or undercooked vegetables, including corn  Dried beans, peas and lentils  Seeds and nuts and foods containing them, including peanut butter and other nut butters  Coconut  Popcorn

## 2020-07-19 NOTE — DISCHARGE NOTE PROVIDER - PROVIDER TOKENS
PROVIDER:[TOKEN:[[id number]],FOLLOWUP:[2 weeks],ESTABLISHEDPATIENT:[T]],FREE:[LAST:[juma],FIRST:[Sahil],PHONE:[phone]],FAX:[(   )    -],ADDRESS:[[address] [place] [address]],SCHEDULEDAPPT:[08/10/2020],ESTABLISHEDPATIENT:[T]],PROVIDER:[TOKEN:[[id number]]]

## 2020-07-19 NOTE — DISCHARGE NOTE PROVIDER - NSDCMRMEDTOKEN_GEN_ALL_CORE_FT
acetaminophen 500 mg oral tablet: 2 tab(s) orally every 6 hours  budesonide: 10 milligram(s) orally once a day  melatonin 5 mg oral tablet: 1 tab(s) orally once a day (at bedtime) acetaminophen 500 mg oral tablet: 2 tab(s) orally every 6 hours  budesonide: 10 milligram(s) orally once a day  Colace 100 mg oral capsule: 1 cap(s) orally 2 times a day   melatonin 5 mg oral tablet: 1 tab(s) orally once a day (at bedtime)  oxyCODONE 5 mg oral capsule: 1 cap(s) orally every 6 hours, As Needed -for severe pain MDD:4   Protonix 40 mg oral delayed release tablet: 1 tab(s) orally once a day

## 2020-07-22 LAB — SURGICAL PATHOLOGY STUDY: SIGNIFICANT CHANGE UP

## 2020-07-23 DIAGNOSIS — K50.80 CROHN'S DISEASE OF BOTH SMALL AND LARGE INTESTINE WITHOUT COMPLICATIONS: ICD-10-CM

## 2020-07-23 DIAGNOSIS — N30.80 OTHER CYSTITIS WITHOUT HEMATURIA: ICD-10-CM

## 2020-07-27 PROBLEM — K50.90 CROHN'S DISEASE, UNSPECIFIED, WITHOUT COMPLICATIONS: Chronic | Status: ACTIVE | Noted: 2020-07-14

## 2020-07-29 RX ORDER — BUDESONIDE 3 MG/1
3 CAPSULE, COATED PELLETS ORAL
Qty: 90 | Refills: 0 | Status: ACTIVE | COMMUNITY
Start: 1900-01-01 | End: 1900-01-01

## 2020-07-31 DIAGNOSIS — G89.18 OTHER ACUTE POSTPROCEDURAL PAIN: ICD-10-CM

## 2020-07-31 RX ORDER — PHENAZOPYRIDINE 200 MG/1
200 TABLET, FILM COATED ORAL 3 TIMES DAILY
Qty: 21 | Refills: 0 | Status: DISCONTINUED | COMMUNITY
Start: 2020-03-16 | End: 2020-07-31

## 2020-07-31 RX ORDER — OXYCODONE 5 MG/1
5 TABLET ORAL EVERY 6 HOURS
Qty: 12 | Refills: 0 | Status: ACTIVE | COMMUNITY
Start: 2020-07-31 | End: 1900-01-01

## 2020-07-31 RX ORDER — METRONIDAZOLE 500 MG/1
500 TABLET ORAL TWICE DAILY
Qty: 20 | Refills: 0 | Status: DISCONTINUED | COMMUNITY
Start: 2020-04-06 | End: 2020-07-31

## 2020-07-31 RX ORDER — METRONIDAZOLE 500 MG/1
500 TABLET ORAL
Qty: 6 | Refills: 0 | Status: DISCONTINUED | COMMUNITY
Start: 2020-03-02 | End: 2020-07-31

## 2020-07-31 RX ORDER — CIPROFLOXACIN HYDROCHLORIDE 500 MG/1
500 TABLET, FILM COATED ORAL
Qty: 20 | Refills: 0 | Status: DISCONTINUED | COMMUNITY
Start: 2020-04-06 | End: 2020-07-31

## 2020-07-31 RX ORDER — PREDNISONE 10 MG/1
10 TABLET ORAL
Qty: 30 | Refills: 1 | Status: DISCONTINUED | COMMUNITY
Start: 2020-04-06 | End: 2020-07-31

## 2020-07-31 RX ORDER — NEOMYCIN SULFATE 500 MG/1
500 TABLET ORAL
Qty: 6 | Refills: 0 | Status: DISCONTINUED | COMMUNITY
Start: 2020-03-02 | End: 2020-07-31

## 2020-07-31 RX ORDER — SULFAMETHOXAZOLE AND TRIMETHOPRIM 800; 160 MG/1; MG/1
800-160 TABLET ORAL TWICE DAILY
Qty: 20 | Refills: 0 | Status: DISCONTINUED | COMMUNITY
Start: 2020-03-04 | End: 2020-07-31

## 2020-08-05 ENCOUNTER — APPOINTMENT (OUTPATIENT)
Dept: GASTROENTEROLOGY | Facility: CLINIC | Age: 35
End: 2020-08-05
Payer: COMMERCIAL

## 2020-08-05 DIAGNOSIS — K50.90 CROHN'S DISEASE, UNSPECIFIED, W/OUT COMPLICATIONS: ICD-10-CM

## 2020-08-05 PROCEDURE — 99215 OFFICE O/P EST HI 40 MIN: CPT | Mod: 95

## 2020-08-05 RX ORDER — ACETAMINOPHEN 325 MG/1
TABLET, FILM COATED ORAL
Refills: 0 | Status: ACTIVE | COMMUNITY

## 2020-08-05 RX ORDER — PANTOPRAZOLE SODIUM 40 MG/1
40 TABLET, DELAYED RELEASE ORAL
Refills: 0 | Status: ACTIVE | COMMUNITY

## 2020-08-05 RX ORDER — DOCUSATE SODIUM 100 MG/1
CAPSULE ORAL
Refills: 0 | Status: ACTIVE | COMMUNITY

## 2020-08-06 PROBLEM — K50.90 CROHN'S DISEASE: Status: ACTIVE | Noted: 2019-12-02

## 2020-08-10 ENCOUNTER — APPOINTMENT (OUTPATIENT)
Dept: COLORECTAL SURGERY | Facility: CLINIC | Age: 35
End: 2020-08-10

## 2020-08-10 NOTE — HISTORY OF PRESENT ILLNESS
[Home] : at home, [unfilled] , at the time of the visit. [Medical Office: ([facility])___] : at the medical office located in  [Verbal consent obtained from patient] : the patient, [unfilled] [_________] : Performed [unfilled] [GERD] : no gastroesophageal reflux disease [Hiatus Hernia] : no hiatus hernia [Peptic Ulcer Disease] : no peptic ulcer disease [Pancreatitis] : no pancreatitis [Cholelithiasis] : no cholelithiasis [Kidney Stone] : no kidney stone [Irritable Bowel Syndrome] : no irritable bowel syndrome [Diverticulitis] : no diverticulitis [Alcohol Abuse] : no alcohol abuse [Malignancy] : no malignancy [Abdominal Surgery] : no abdominal surgery [Appendectomy] : no appendectomy [de-identified] : 35 yo F hx severe ileal Crohn's disease (likely aggressive phenotype), originally referred to us by Dr. Miranda; her disease has been complicated by anterior abdominal/pelvic abscess; has now undergone robotic-asisted ileal colic resection with takedown of enterovesical fistula. \par \par Since surgery, she continues to recover. Recently stopped taking narcotics, only using tylenol for pain. Overall her bladder symptoms are much better - no further dysuria but does feel generally she's not back to her baseline. Is trying to be more active but also taking time to rest. Surgical sites healing with as per patient report. No drainage and no fevers/chills. Reports appetite is good, remains on low fiber with slow liberalizing of fruits. No blood in her stool. Having 2-3 BMs per day wit some incomplete defecation. Using colace daily. \par \par HPI:  April 2019: Abdominal pain initially started, was moving all around. She initially thought it was her menstrual cycle so waited to go in. She has always been conscious of fiber, her diet. But no major GI issues in the past. \par \par May 2019: Went to urgent care. Was seen by 2 doctors. Initially did an xray, then ultrasound, then CT scan. Then she was sent to the hospital. She was hospitalized at [facility] for the abdomial pain. She was asked to stay on bedrest at home for a week. SHe was getting stressed because she needed to get back to work. \par \par May 2019: saw a GI doctor, got a few more CT scans - no change but things not getting worse. Dr. Zamarripa (GI surgeon in [facility]). \par \par June 2019: She was started on budesonide. She has been on this since June 2019. If she went off of steroids for 2 days, she would be in extreme pain. EGD and Colonoscopy were done by Dr. Zamarripa. He told her this is "absolutely Crohn's". \par \par Went back to see PMD Chela Sifuentes ([facility]). She referred her to Dr. Miranda. Since then she has been having a lot of bladder problems. The thought is that intestines are pushing on bladder. She can't sleep because always urinating. She is getting frequent UTIs. She has an abscess on her bladder. \par \par She still notices that during her periods, things are much worse and can't work. Budesonide makes symptoms livable. She feels exhausted and tired all the time. She wakes up 10x a night to urinate. \par \par Currently she has been having diarrhea. If she has solid stool usually hurts coming out. It seems to fluctuate. If she wipes sees a little blood on the toilet paper. Goes to the bathroom once a day. Has been loose in cosistency a lot. \par \par Started smoking a lot of marijuana because this makes her feel like eating and gives her some relief. She started having a "fear" of eating because worried about having abdominal pain or food not setting well. Usually goes once in the morning. \par \par In the hospital was having fevers. No nausea/vomiting. No air in the urine or solid material in the urine.\par \par No EIMs. \par \par Patient reports she has had several CT scans (approx 4). and the most recent CT reportedly showed an enlarging abscess. She has been on and off of antibiotics for UTI. Most recently completed a course for UTI about a week ago (does not recall the names of the medications). She notes that Dr. Miranda also thought she may need antibiotics for the abscess. \par \par CT scan 2015: \par Inflammatory changes of a long segment of distal ileum highly suspicious for infectious or inflammatory disease with small contained perforation in the midline upper pelvis. No evidence of abscess. \par \par CT scan 2019: \par Compared to the prior CT scans is again noted lung segment transmural circumferential disease of the TI and previously described abscess located just anterior to this diseased TI is again noted. However, it is now intimately related and partially incorporated into the anterosuperior wall of the urinary bladder. There is presently no air - gas in the urinary bladder. Urinary bladder is not distended. Urology consultation is recommended. \par \par The appendix appears unremarkable. There is no obvious olitis pattern. No evidence of diverticular disease. Enhancing finding the right ovary which is probably follicular cyst. No extra-axial ovarian adnexal masses nor free fluid int he cul-de-sac. \par \par \par SH: \navdeep Works as  at a [employer] in [place] - furloughed recently, looking for a new job \par Use marijuana twice a day \par Prior smoker: smoked socially in college to now, but has completely stopped \par EtOH: none now; previous to surgery would have glass of wine with dinner, if attending an event once a week, in general has been less\par \par FH: \par Does not know family history other than mom and dad, and parents do not have IBD, sister does not have IBD, no colorectal cancer\par \par PMH: denies\par \par PSH: denies\par \par  [Cholecystectomy] : no cholecystectomy [de-identified] : Results will be obtained from GI doctor

## 2020-08-10 NOTE — CONSULT LETTER
[Dear  ___] : Dear  [unfilled], [Please see my note below.] : Please see my note below. [( Thank you for referring [unfilled] for consultation for _____ )] : Thank you for referring [unfilled] for consultation for [unfilled] [Sincerely,] : Sincerely, [Consult Closing:] : Thank you very much for allowing me to participate in the care of this patient.  If you have any questions, please do not hesitate to contact me. [DrAnnmarie  ___] : Dr. CODY [DrAnnmarie ___] : Dr. CODY [FreeTextEntry3] : Ruperto Torrez MD\par Associate Professor of Medicine\par Director IBD Program\par [facility]\par

## 2020-08-10 NOTE — PHYSICAL EXAM
[Sclera] : the sclera and conjunctiva were normal [General Appearance - Alert] : alert [Neck Appearance] : the appearance of the neck was normal [Outer Ear] : the ears and nose were normal in appearance [Oriented To Time, Place, And Person] : oriented to person, place, and time [] : no respiratory distress

## 2020-08-10 NOTE — ASSESSMENT
[FreeTextEntry1] : 35 yo F PMHx ileal Crohns disease with enlarging abscess with enterovesicular fistula s/p robotic-assisted ileal colic resection with takedown of enterovesical fistula July 14; originally  referred by Dr. Miranda.\par \par Crohn's disease with hx ileal inflammation on imaging and intraabdominal abscess. S/P resection doing well \par - patient in a surgical remission; will plan to start  biologic therapy with Dr. Miranda; given her severe needle phobia and inability to self-inject with either ADA or Stelara, we have recommended Infliximab 5mg/kg as post operative prophylaxis.\par - We detailed increased risk of infections (bacterial, fungal, viral) which we will mitigate by immunizing in appropriate fashion (HBV,zoster, flu, pneumovax); risk of malignancy with time spent on lymphoma (HSTCL in young adults) and skin cancers (need for derm annual eval) ; risk of liver injury, bone marrow suppression, CNS disorders, allergic and infusion (if IV admin) reactions, idiosyncratic skin reactions, joint problems, among other rare and unusual manifestations. We discussed the need to notify if fevers or major illness prior to infusions, and the need to maintain follow up and obtain request labs and evaluations. In addition, we have already discusses resources such as [facility] and the manufacturers "patients" page to obtain additional detailed information on the medication.\par - start to wean off budesonide by 1 tablet/week; has been on budesonide for 1 year therefore slow taper is necessary \par - cont to f/u Dr. Jimenes as needed \par - plan for Cscope 6 months after starting biologic therapy with Dr. Miranda\par - Noted urinary urgency is likley reated to post op state and h/o fistula takedown and expect this to improve slowly.\par \par HCM: \par - will need dexa scan given ongoing budesonide\par - will need B12, iron and Vit D checked \par - avoiding NSAIDs and tobacco \par - will need prebiologic work up for anti-TNF therapy and vaccination records \par \par The patient is aware we are available as needed for follow up.\par \par Nan Wallace NP \par

## 2021-11-29 NOTE — PRE-OP CHECKLIST - SPO2 (%)
98 Propranolol Pregnancy And Lactation Text: This medication is Pregnancy Category C and it isn't known if it is safe during pregnancy. It is excreted in breast milk.